# Patient Record
Sex: FEMALE | Race: WHITE | NOT HISPANIC OR LATINO | Employment: FULL TIME | URBAN - METROPOLITAN AREA
[De-identification: names, ages, dates, MRNs, and addresses within clinical notes are randomized per-mention and may not be internally consistent; named-entity substitution may affect disease eponyms.]

---

## 2018-12-18 PROBLEM — O24.419 GESTATIONAL DIABETES MELLITUS (GDM) IN THIRD TRIMESTER: Status: ACTIVE | Noted: 2018-12-18

## 2018-12-18 PROBLEM — Z34.90 ENCOUNTER FOR INDUCTION OF LABOR: Status: ACTIVE | Noted: 2018-12-18

## 2018-12-18 PROBLEM — Z34.90 TERM PREGNANCY: Status: ACTIVE | Noted: 2018-12-18

## 2018-12-26 PROBLEM — R50.9 FEVER OF UNKNOWN ORIGIN: Status: ACTIVE | Noted: 2018-12-26

## 2018-12-29 PROBLEM — R50.9 FEVER OF UNKNOWN ORIGIN: Status: RESOLVED | Noted: 2018-12-26 | Resolved: 2018-12-29

## 2019-10-03 LAB — PAP SMEAR: NORMAL

## 2020-10-23 ENCOUNTER — LAB VISIT (OUTPATIENT)
Dept: LAB | Facility: HOSPITAL | Age: 25
End: 2020-10-23
Attending: FAMILY MEDICINE
Payer: COMMERCIAL

## 2020-10-23 ENCOUNTER — OFFICE VISIT (OUTPATIENT)
Dept: FAMILY MEDICINE | Facility: CLINIC | Age: 25
End: 2020-10-23
Payer: COMMERCIAL

## 2020-10-23 VITALS
TEMPERATURE: 98 F | SYSTOLIC BLOOD PRESSURE: 122 MMHG | OXYGEN SATURATION: 97 % | BODY MASS INDEX: 30.36 KG/M2 | HEIGHT: 62 IN | DIASTOLIC BLOOD PRESSURE: 86 MMHG | WEIGHT: 165 LBS | HEART RATE: 91 BPM

## 2020-10-23 DIAGNOSIS — R07.9 EXERTIONAL CHEST PAIN: ICD-10-CM

## 2020-10-23 DIAGNOSIS — Z00.00 GENERAL MEDICAL EXAM: ICD-10-CM

## 2020-10-23 DIAGNOSIS — Z00.00 GENERAL MEDICAL EXAM: Primary | ICD-10-CM

## 2020-10-23 DIAGNOSIS — Z12.4 CERVICAL CANCER SCREENING: ICD-10-CM

## 2020-10-23 DIAGNOSIS — Z83.49 FAMILY HISTORY OF THYROIDITIS: ICD-10-CM

## 2020-10-23 DIAGNOSIS — Z82.69 FAMILY HISTORY OF RHEUMATISM: ICD-10-CM

## 2020-10-23 DIAGNOSIS — Z23 IMMUNIZATION DUE: ICD-10-CM

## 2020-10-23 LAB
ALBUMIN SERPL BCP-MCNC: 4.3 G/DL (ref 3.5–5.2)
ALP SERPL-CCNC: 68 U/L (ref 55–135)
ALT SERPL W/O P-5'-P-CCNC: 17 U/L (ref 10–44)
ANION GAP SERPL CALC-SCNC: 11 MMOL/L (ref 8–16)
AST SERPL-CCNC: 23 U/L (ref 10–40)
BASOPHILS # BLD AUTO: 0.09 K/UL (ref 0–0.2)
BASOPHILS NFR BLD: 1.2 % (ref 0–1.9)
BILIRUB SERPL-MCNC: 1.6 MG/DL (ref 0.1–1)
BUN SERPL-MCNC: 12 MG/DL (ref 6–20)
CALCIUM SERPL-MCNC: 9.2 MG/DL (ref 8.7–10.5)
CHLORIDE SERPL-SCNC: 106 MMOL/L (ref 95–110)
CHOLEST SERPL-MCNC: 160 MG/DL (ref 120–199)
CHOLEST/HDLC SERPL: 4 {RATIO} (ref 2–5)
CO2 SERPL-SCNC: 24 MMOL/L (ref 23–29)
CREAT SERPL-MCNC: 0.7 MG/DL (ref 0.5–1.4)
CRP SERPL-MCNC: 1.1 MG/L (ref 0–8.2)
DIFFERENTIAL METHOD: NORMAL
EOSINOPHIL # BLD AUTO: 0.2 K/UL (ref 0–0.5)
EOSINOPHIL NFR BLD: 3.1 % (ref 0–8)
ERYTHROCYTE [DISTWIDTH] IN BLOOD BY AUTOMATED COUNT: 12.3 % (ref 11.5–14.5)
ERYTHROCYTE [SEDIMENTATION RATE] IN BLOOD BY WESTERGREN METHOD: <2 MM/HR (ref 0–36)
EST. GFR  (AFRICAN AMERICAN): >60 ML/MIN/1.73 M^2
EST. GFR  (NON AFRICAN AMERICAN): >60 ML/MIN/1.73 M^2
GLUCOSE SERPL-MCNC: 87 MG/DL (ref 70–110)
HCT VFR BLD AUTO: 45.2 % (ref 37–48.5)
HDLC SERPL-MCNC: 40 MG/DL (ref 40–75)
HDLC SERPL: 25 % (ref 20–50)
HGB BLD-MCNC: 15.2 G/DL (ref 12–16)
IMM GRANULOCYTES # BLD AUTO: 0.02 K/UL (ref 0–0.04)
IMM GRANULOCYTES NFR BLD AUTO: 0.3 % (ref 0–0.5)
LDLC SERPL CALC-MCNC: 101.8 MG/DL (ref 63–159)
LYMPHOCYTES # BLD AUTO: 2.2 K/UL (ref 1–4.8)
LYMPHOCYTES NFR BLD: 29.2 % (ref 18–48)
MCH RBC QN AUTO: 29.8 PG (ref 27–31)
MCHC RBC AUTO-ENTMCNC: 33.6 G/DL (ref 32–36)
MCV RBC AUTO: 89 FL (ref 82–98)
MONOCYTES # BLD AUTO: 0.6 K/UL (ref 0.3–1)
MONOCYTES NFR BLD: 7.3 % (ref 4–15)
NEUTROPHILS # BLD AUTO: 4.5 K/UL (ref 1.8–7.7)
NEUTROPHILS NFR BLD: 58.9 % (ref 38–73)
NONHDLC SERPL-MCNC: 120 MG/DL
NRBC BLD-RTO: 0 /100 WBC
PLATELET # BLD AUTO: 246 K/UL (ref 150–350)
PMV BLD AUTO: 10.6 FL (ref 9.2–12.9)
POTASSIUM SERPL-SCNC: 3.9 MMOL/L (ref 3.5–5.1)
PROT SERPL-MCNC: 7 G/DL (ref 6–8.4)
RBC # BLD AUTO: 5.1 M/UL (ref 4–5.4)
RHEUMATOID FACT SERPL-ACNC: <10 IU/ML (ref 0–15)
SODIUM SERPL-SCNC: 141 MMOL/L (ref 136–145)
T3FREE SERPL-MCNC: 3.3 PG/ML (ref 2.3–4.2)
T4 FREE SERPL-MCNC: 1.09 NG/DL (ref 0.71–1.51)
TRIGL SERPL-MCNC: 91 MG/DL (ref 30–150)
TSH SERPL DL<=0.005 MIU/L-ACNC: 1.64 UIU/ML (ref 0.4–4)
WBC # BLD AUTO: 7.64 K/UL (ref 3.9–12.7)

## 2020-10-23 PROCEDURE — 99999 PR PBB SHADOW E&M-NEW PATIENT-LVL IV: ICD-10-PCS | Mod: PBBFAC,,, | Performed by: FAMILY MEDICINE

## 2020-10-23 PROCEDURE — 93005 EKG 12-LEAD: ICD-10-PCS | Mod: S$GLB,,, | Performed by: FAMILY MEDICINE

## 2020-10-23 PROCEDURE — 85025 COMPLETE CBC W/AUTO DIFF WBC: CPT

## 2020-10-23 PROCEDURE — 84481 FREE ASSAY (FT-3): CPT

## 2020-10-23 PROCEDURE — 80061 LIPID PANEL: CPT

## 2020-10-23 PROCEDURE — 86803 HEPATITIS C AB TEST: CPT

## 2020-10-23 PROCEDURE — 85652 RBC SED RATE AUTOMATED: CPT

## 2020-10-23 PROCEDURE — 90651 HPV VACCINE 9-VALENT 3 DOSE IM: ICD-10-PCS | Mod: S$GLB,,, | Performed by: FAMILY MEDICINE

## 2020-10-23 PROCEDURE — 99385 PREV VISIT NEW AGE 18-39: CPT | Mod: 25,S$GLB,, | Performed by: FAMILY MEDICINE

## 2020-10-23 PROCEDURE — 86140 C-REACTIVE PROTEIN: CPT

## 2020-10-23 PROCEDURE — 99999 PR PBB SHADOW E&M-NEW PATIENT-LVL IV: CPT | Mod: PBBFAC,,, | Performed by: FAMILY MEDICINE

## 2020-10-23 PROCEDURE — 84443 ASSAY THYROID STIM HORMONE: CPT

## 2020-10-23 PROCEDURE — 90651 9VHPV VACCINE 2/3 DOSE IM: CPT | Mod: S$GLB,,, | Performed by: FAMILY MEDICINE

## 2020-10-23 PROCEDURE — 90471 HPV VACCINE 9-VALENT 3 DOSE IM: ICD-10-PCS | Mod: S$GLB,,, | Performed by: FAMILY MEDICINE

## 2020-10-23 PROCEDURE — 93005 ELECTROCARDIOGRAM TRACING: CPT | Mod: S$GLB,,, | Performed by: FAMILY MEDICINE

## 2020-10-23 PROCEDURE — 80053 COMPREHEN METABOLIC PANEL: CPT

## 2020-10-23 PROCEDURE — 36415 COLL VENOUS BLD VENIPUNCTURE: CPT | Mod: PN

## 2020-10-23 PROCEDURE — 84439 ASSAY OF FREE THYROXINE: CPT

## 2020-10-23 PROCEDURE — 90471 IMMUNIZATION ADMIN: CPT | Mod: S$GLB,,, | Performed by: FAMILY MEDICINE

## 2020-10-23 PROCEDURE — 99385 PR PREVENTIVE VISIT,NEW,18-39: ICD-10-PCS | Mod: 25,S$GLB,, | Performed by: FAMILY MEDICINE

## 2020-10-23 PROCEDURE — 86431 RHEUMATOID FACTOR QUANT: CPT

## 2020-10-23 PROCEDURE — 93010 ELECTROCARDIOGRAM REPORT: CPT | Mod: S$GLB,,, | Performed by: INTERNAL MEDICINE

## 2020-10-23 PROCEDURE — 83036 HEMOGLOBIN GLYCOSYLATED A1C: CPT

## 2020-10-23 PROCEDURE — 86038 ANTINUCLEAR ANTIBODIES: CPT

## 2020-10-23 PROCEDURE — 93010 EKG 12-LEAD: ICD-10-PCS | Mod: S$GLB,,, | Performed by: INTERNAL MEDICINE

## 2020-10-23 RX ORDER — CETIRIZINE HYDROCHLORIDE 5 MG/1
5 TABLET ORAL DAILY
COMMUNITY

## 2020-10-23 NOTE — PROGRESS NOTES
THIS DOCUMENT WAS MADE IN PART WITH VOICE RECOGNITION SOFTWARE.  OCCASIONALLY THIS SOFTWARE WILL MISINTERPRET WORDS OR PHRASES.    Assessment and Plan:    1. General medical exam  - Comprehensive Metabolic Panel; Future  - Lipid Panel; Future  - Hepatitis C Antibody; Future  - Hemoglobin A1C; Future  - CBC auto differential; Future    2. Immunization due  - (In Office Administered) HPV Vaccine (9-Valent) (3 Dose) (IM)    3. Cervical cancer screening  Get records    4. Family history of thyroiditis  - TSH; Future  - T4, Free; Future  - T3, Free; Future    5. Family history of rheumatism  - Rheumatoid Factor; Future  - NICHOLAS Screen w/Reflex; Future  - C-Reactive Protein; Future  - Sedimentation rate; Future    6. Exertional chest pain  Low suspicion for any cardiovascular disease, does have concerning family history with early onset and grandmother and grandfather  - EKG 12-lead  - Stress Echo Which stress agent will be used? Treadmill Exercise; Color Flow Doppler? No; Future        ______________________________________________________________________  Subjective:    Chief Complaint:  Chief Complaint   Patient presents with    Establish Care    Annual Exam        HPI:  Renea is a 24 y.o. year old     Establish care    Concerns :   Family history of Hashimotos :   Pt never tested, would like to be checked     Family history of heart disease / tachycardia   Reports palps / chest pain + SOB  : during exercise/ strenuous work   Pain is substernal, non radiating, lasting for a few minutes before resolving   Happens every time  Grandfather had some type of heart disease in his 30's   Grandmother  in 50's of an MI     Family h/o RA   She complains of left shoulder pain and right shoulder pain  No other joint pains     H/o Pancreatitis   Gallstone pancreatitis in early s  S/P larisa malin           Past Medical History:  Past Medical History:   Diagnosis Date    Pancreatitis        Past Surgical History:  Past  Surgical History:   Procedure Laterality Date    ADENOIDECTOMY      CHOLECYSTECTOMY      SKIN GRAFT      TONSILLECTOMY      WISDOM TOOTH EXTRACTION         Family History:  Family History   Problem Relation Age of Onset    Diabetes Maternal Grandmother     Heart disease Maternal Grandmother     Heart disease Maternal Grandfather        Social History:  Social History     Socioeconomic History    Marital status: Single     Spouse name: Not on file    Number of children: Not on file    Years of education: Not on file    Highest education level: Not on file   Occupational History    Not on file   Social Needs    Financial resource strain: Not on file    Food insecurity     Worry: Not on file     Inability: Not on file    Transportation needs     Medical: Not on file     Non-medical: Not on file   Tobacco Use    Smoking status: Never Smoker    Smokeless tobacco: Never Used   Substance and Sexual Activity    Alcohol use: No    Drug use: No    Sexual activity: Yes     Partners: Male   Lifestyle    Physical activity     Days per week: Not on file     Minutes per session: Not on file    Stress: Not on file   Relationships    Social connections     Talks on phone: Not on file     Gets together: Not on file     Attends Religion service: Not on file     Active member of club or organization: Not on file     Attends meetings of clubs or organizations: Not on file     Relationship status: Not on file   Other Topics Concern    Not on file   Social History Narrative    Not on file       Medications:  Current Outpatient Medications on File Prior to Visit   Medication Sig Dispense Refill    cetirizine (ZYRTEC) 5 MG tablet Take 5 mg by mouth once daily.      [DISCONTINUED] ibuprofen (ADVIL,MOTRIN) 600 MG tablet Take 1 tablet (600 mg total) by mouth every 6 (six) hours. 40 tablet 2     No current facility-administered medications on file prior to visit.   "      Allergies:  Penicillins    Immunizations:  Immunization History   Administered Date(s) Administered    Influenza - Quadrivalent - PF *Preferred* (6 months and older) 12/20/2018    Tdap 12/20/2018       Review of Systems:  Review of Systems   All other systems reviewed and are negative.      Objective:    Vitals:  Vitals:    10/23/20 0841   BP: 122/86   Pulse: 91   Temp: 98.4 °F (36.9 °C)   TempSrc: Temporal   SpO2: 97%   Weight: 74.9 kg (165 lb 0.2 oz)   Height: 5' 2" (1.575 m)   PainSc: 0-No pain       Physical Exam  Vitals signs reviewed.   Constitutional:       General: She is not in acute distress.  HENT:      Head: Normocephalic and atraumatic.   Eyes:      Pupils: Pupils are equal, round, and reactive to light.   Neck:      Musculoskeletal: Neck supple.   Cardiovascular:      Rate and Rhythm: Normal rate and regular rhythm.      Heart sounds: No murmur. No friction rub.   Pulmonary:      Effort: Pulmonary effort is normal.      Breath sounds: Normal breath sounds.   Abdominal:      General: Bowel sounds are normal. There is no distension.      Palpations: Abdomen is soft.      Tenderness: There is no abdominal tenderness.   Skin:     General: Skin is warm and dry.      Findings: No rash.   Psychiatric:         Behavior: Behavior normal.         Data:  No previous labs, imaging, or notes available.        Jeffrey Biggs MD  Family Medicine      "

## 2020-10-24 LAB
ESTIMATED AVG GLUCOSE: 85 MG/DL (ref 68–131)
HBA1C MFR BLD HPLC: 4.6 % (ref 4–5.6)

## 2020-10-26 LAB
ANA SER QL IF: NORMAL
HCV AB SERPL QL IA: NEGATIVE

## 2020-10-27 ENCOUNTER — PATIENT MESSAGE (OUTPATIENT)
Dept: FAMILY MEDICINE | Facility: CLINIC | Age: 25
End: 2020-10-27

## 2020-10-27 DIAGNOSIS — D64.9 NORMOCYTIC ANEMIA: Primary | ICD-10-CM

## 2020-10-27 NOTE — TELEPHONE ENCOUNTER
Please call patient, let her know lab work looks good except her blood count which is low.  I would like her to do some follow-up labs so we can investigate the cause.

## 2020-10-30 ENCOUNTER — PATIENT MESSAGE (OUTPATIENT)
Dept: LAB | Facility: HOSPITAL | Age: 25
End: 2020-10-30

## 2020-11-02 ENCOUNTER — LAB VISIT (OUTPATIENT)
Dept: LAB | Facility: HOSPITAL | Age: 25
End: 2020-11-02
Attending: FAMILY MEDICINE
Payer: COMMERCIAL

## 2020-11-02 DIAGNOSIS — D64.9 NORMOCYTIC ANEMIA: ICD-10-CM

## 2020-11-02 LAB
FERRITIN SERPL-MCNC: 59 NG/ML (ref 20–300)
FOLATE SERPL-MCNC: 6.9 NG/ML (ref 4–24)
IRON SERPL-MCNC: 76 UG/DL (ref 30–160)
PATH REV BLD -IMP: NORMAL
SATURATED IRON: 20 % (ref 20–50)
TOTAL IRON BINDING CAPACITY: 380 UG/DL (ref 250–450)
TRANSFERRIN SERPL-MCNC: 257 MG/DL (ref 200–375)
VIT B12 SERPL-MCNC: 271 PG/ML (ref 210–950)

## 2020-11-02 PROCEDURE — 82728 ASSAY OF FERRITIN: CPT

## 2020-11-02 PROCEDURE — 85060 PATHOLOGIST REVIEW: ICD-10-PCS | Mod: ,,, | Performed by: PATHOLOGY

## 2020-11-02 PROCEDURE — 85025 COMPLETE CBC W/AUTO DIFF WBC: CPT

## 2020-11-02 PROCEDURE — 82607 VITAMIN B-12: CPT

## 2020-11-02 PROCEDURE — 83020 HEMOGLOBIN ELECTROPHORESIS: CPT

## 2020-11-02 PROCEDURE — 36415 COLL VENOUS BLD VENIPUNCTURE: CPT | Mod: PN

## 2020-11-02 PROCEDURE — 82746 ASSAY OF FOLIC ACID SERUM: CPT

## 2020-11-02 PROCEDURE — 85060 BLOOD SMEAR INTERPRETATION: CPT | Mod: ,,, | Performed by: PATHOLOGY

## 2020-11-02 PROCEDURE — 83540 ASSAY OF IRON: CPT

## 2020-11-03 LAB
BASOPHILS # BLD AUTO: 0.1 K/UL (ref 0–0.2)
BASOPHILS NFR BLD: 1.3 % (ref 0–1.9)
DIFFERENTIAL METHOD: ABNORMAL
EOSINOPHIL # BLD AUTO: 0.3 K/UL (ref 0–0.5)
EOSINOPHIL NFR BLD: 3.2 % (ref 0–8)
ERYTHROCYTE [DISTWIDTH] IN BLOOD BY AUTOMATED COUNT: 12.7 % (ref 11.5–14.5)
HCT VFR BLD AUTO: 45.2 % (ref 37–48.5)
HGB BLD-MCNC: 15.4 G/DL (ref 12–16)
IMM GRANULOCYTES # BLD AUTO: 0.02 K/UL (ref 0–0.04)
IMM GRANULOCYTES NFR BLD AUTO: 0.3 % (ref 0–0.5)
LYMPHOCYTES # BLD AUTO: 2.2 K/UL (ref 1–4.8)
LYMPHOCYTES NFR BLD: 28.8 % (ref 18–48)
MCH RBC QN AUTO: 31.2 PG (ref 27–31)
MCHC RBC AUTO-ENTMCNC: 34.1 G/DL (ref 32–36)
MCV RBC AUTO: 92 FL (ref 82–98)
MONOCYTES # BLD AUTO: 0.6 K/UL (ref 0.3–1)
MONOCYTES NFR BLD: 7.9 % (ref 4–15)
NEUTROPHILS # BLD AUTO: 4.5 K/UL (ref 1.8–7.7)
NEUTROPHILS NFR BLD: 58.5 % (ref 38–73)
NRBC BLD-RTO: 0 /100 WBC
PLATELET # BLD AUTO: 328 K/UL (ref 150–350)
PMV BLD AUTO: 11 FL (ref 9.2–12.9)
RBC # BLD AUTO: 4.94 M/UL (ref 4–5.4)
WBC # BLD AUTO: 7.75 K/UL (ref 3.9–12.7)

## 2020-11-04 ENCOUNTER — TELEPHONE (OUTPATIENT)
Dept: FAMILY MEDICINE | Facility: CLINIC | Age: 25
End: 2020-11-04

## 2020-11-04 DIAGNOSIS — R07.9 EXERTIONAL CHEST PAIN: Primary | ICD-10-CM

## 2020-11-04 LAB
HGB A MFR BLD ELPH: 97.6 % (ref 95.8–98)
HGB A2 MFR BLD: 2.4 % (ref 2–3.3)
HGB F MFR BLD: 0 % (ref 0–0.9)
HGB FRACT BLD ELPH-IMP: NORMAL
HGB OTHER MFR BLD ELPH: 0 %
PATH REV BLD -IMP: NORMAL
THEVP VARIANT 2: NORMAL
THEVP VARIANT 3: NORMAL

## 2020-11-04 NOTE — TELEPHONE ENCOUNTER
[facility] has denied stress echo as it did not meet their criteria.  Please call [phone] option 3 with case # [id number] to do an appeal.  Thanks Clover

## 2020-11-04 NOTE — TELEPHONE ENCOUNTER
They will not allow me to do an echocardiogram stress test but they will allow an exercise stress test and a separate echocardiogram.  Please help schedule both.  Orders placed

## 2020-11-05 ENCOUNTER — CLINICAL SUPPORT (OUTPATIENT)
Dept: CARDIOLOGY | Facility: CLINIC | Age: 25
End: 2020-11-05
Attending: FAMILY MEDICINE
Payer: COMMERCIAL

## 2020-11-05 VITALS
WEIGHT: 165 LBS | HEIGHT: 62 IN | BODY MASS INDEX: 30.36 KG/M2 | HEIGHT: 62 IN | WEIGHT: 165 LBS | BODY MASS INDEX: 30.36 KG/M2

## 2020-11-05 DIAGNOSIS — R07.9 EXERTIONAL CHEST PAIN: ICD-10-CM

## 2020-11-05 LAB
AV INDEX (PROSTH): 0.94
AV MEAN GRADIENT: 3 MMHG
AV PEAK GRADIENT: 5 MMHG
AV VALVE AREA: 3.21 CM2
AV VELOCITY RATIO: 0.8
BSA FOR ECHO PROCEDURE: 1.81 M2
CV ECHO LV RWT: 0.3 CM
DOP CALC AO PEAK VEL: 1.14 M/S
DOP CALC AO VTI: 18.89 CM
DOP CALC LVOT AREA: 3.4 CM2
DOP CALC LVOT DIAMETER: 2.09 CM
DOP CALC LVOT PEAK VEL: 0.91 M/S
DOP CALC LVOT STROKE VOLUME: 60.62 CM3
DOP CALCLVOT PEAK VEL VTI: 17.68 CM
E WAVE DECELERATION TIME: 126.13 MSEC
E/A RATIO: 1.29
E/E' RATIO: 6 M/S
ECHO LV POSTERIOR WALL: 0.72 CM (ref 0.6–1.1)
FRACTIONAL SHORTENING: 33 % (ref 28–44)
INTERVENTRICULAR SEPTUM: 0.88 CM (ref 0.6–1.1)
LA MAJOR: 3.54 CM
LA MINOR: 3.54 CM
LA WIDTH: 3.57 CM
LEFT ATRIUM SIZE: 2.52 CM
LEFT ATRIUM VOLUME INDEX: 15.4 ML/M2
LEFT ATRIUM VOLUME: 27.07 CM3
LEFT INTERNAL DIMENSION IN SYSTOLE: 3.19 CM (ref 2.1–4)
LEFT VENTRICLE DIASTOLIC VOLUME INDEX: 59.13 ML/M2
LEFT VENTRICLE DIASTOLIC VOLUME: 104.15 ML
LEFT VENTRICLE MASS INDEX: 70 G/M2
LEFT VENTRICLE SYSTOLIC VOLUME INDEX: 23.1 ML/M2
LEFT VENTRICLE SYSTOLIC VOLUME: 40.64 ML
LEFT VENTRICULAR INTERNAL DIMENSION IN DIASTOLE: 4.73 CM (ref 3.5–6)
LEFT VENTRICULAR MASS: 123.58 G
LV LATERAL E/E' RATIO: 5 M/S
LV SEPTAL E/E' RATIO: 7.5 M/S
MV PEAK A VEL: 0.58 M/S
MV PEAK E VEL: 0.75 M/S
MV STENOSIS PRESSURE HALF TIME: 36.58 MS
MV VALVE AREA P 1/2 METHOD: 6.01 CM2
PULM VEIN S/D RATIO: 0.76
PV PEAK D VEL: 0.55 M/S
PV PEAK S VEL: 0.42 M/S
RA MAJOR: 3.52 CM
RA PRESSURE: 3 MMHG
RA WIDTH: 2.54 CM
RIGHT VENTRICULAR END-DIASTOLIC DIMENSION: 2.59 CM
TDI LATERAL: 0.15 M/S
TDI SEPTAL: 0.1 M/S
TDI: 0.13 M/S
TRICUSPID ANNULAR PLANE SYSTOLIC EXCURSION: 1.84 CM

## 2020-11-05 PROCEDURE — 93015 CV STRESS TEST SUPVJ I&R: CPT | Mod: S$GLB,,, | Performed by: INTERNAL MEDICINE

## 2020-11-05 PROCEDURE — 93306 TTE W/DOPPLER COMPLETE: CPT | Mod: S$GLB,,, | Performed by: INTERNAL MEDICINE

## 2020-11-05 PROCEDURE — 93015 EXERCISE STRESS - EKG (CUPID ONLY): ICD-10-PCS | Mod: S$GLB,,, | Performed by: INTERNAL MEDICINE

## 2020-11-05 PROCEDURE — 99999 PR PBB SHADOW E&M-EST. PATIENT-LVL I: ICD-10-PCS | Mod: PBBFAC,,,

## 2020-11-05 PROCEDURE — 93306 ECHO (CUPID ONLY): ICD-10-PCS | Mod: S$GLB,,, | Performed by: INTERNAL MEDICINE

## 2020-11-05 PROCEDURE — 99999 PR PBB SHADOW E&M-EST. PATIENT-LVL I: CPT | Mod: PBBFAC,,,

## 2020-11-06 LAB
CV STRESS BASE HR: 125 BPM
DIASTOLIC BLOOD PRESSURE: 89 MMHG
OHS CV CPX 1 MINUTE RECOVERY HEART RATE: 117 BPM
OHS CV CPX 85 PERCENT MAX PREDICTED HEART RATE MALE: 156
OHS CV CPX ESTIMATED METS: 9
OHS CV CPX MAX PREDICTED HEART RATE: 183
OHS CV CPX PATIENT IS FEMALE: 1
OHS CV CPX PATIENT IS MALE: 0
OHS CV CPX PEAK DIASTOLIC BLOOD PRESSURE: 60 MMHG
OHS CV CPX PEAK HEAR RATE: 160 BPM
OHS CV CPX PEAK RATE PRESSURE PRODUCT: NORMAL
OHS CV CPX PEAK SYSTOLIC BLOOD PRESSURE: 148 MMHG
OHS CV CPX PERCENT MAX PREDICTED HEART RATE ACHIEVED: 87
OHS CV CPX RATE PRESSURE PRODUCT PRESENTING: NORMAL
STRESS ECHO POST EXERCISE DUR MIN: 5 MINUTES
STRESS ECHO POST EXERCISE DUR SEC: 19 SECONDS
SYSTOLIC BLOOD PRESSURE: 118 MMHG

## 2020-11-24 ENCOUNTER — PATIENT OUTREACH (OUTPATIENT)
Dept: ADMINISTRATIVE | Facility: HOSPITAL | Age: 25
End: 2020-11-24

## 2020-12-17 ENCOUNTER — PATIENT MESSAGE (OUTPATIENT)
Dept: FAMILY MEDICINE | Facility: CLINIC | Age: 25
End: 2020-12-17

## 2020-12-28 ENCOUNTER — CLINICAL SUPPORT (OUTPATIENT)
Dept: FAMILY MEDICINE | Facility: CLINIC | Age: 25
End: 2020-12-28
Payer: COMMERCIAL

## 2020-12-28 DIAGNOSIS — Z23 ENCOUNTER FOR ADMINISTRATION OF VACCINE: Primary | ICD-10-CM

## 2020-12-28 PROCEDURE — 90651 9VHPV VACCINE 2/3 DOSE IM: CPT | Mod: S$GLB,,, | Performed by: FAMILY MEDICINE

## 2020-12-28 PROCEDURE — 90471 IMMUNIZATION ADMIN: CPT | Mod: S$GLB,,, | Performed by: FAMILY MEDICINE

## 2020-12-28 PROCEDURE — 90471 HPV VACCINE 9-VALENT 3 DOSE IM: ICD-10-PCS | Mod: S$GLB,,, | Performed by: FAMILY MEDICINE

## 2020-12-28 PROCEDURE — 90651 HPV VACCINE 9-VALENT 3 DOSE IM: ICD-10-PCS | Mod: S$GLB,,, | Performed by: FAMILY MEDICINE

## 2020-12-28 NOTE — PROGRESS NOTES
Confirmed pt name and . Verified medication with Em MCCLELLAN LPN. Administered Gardasil IM to Left deltoid per orders. Dose #2. Pt tolerated well. Scheduled pt for HPV dose #3 on 20.

## 2021-04-22 ENCOUNTER — PATIENT MESSAGE (OUTPATIENT)
Dept: FAMILY MEDICINE | Facility: CLINIC | Age: 26
End: 2021-04-22

## 2021-04-30 ENCOUNTER — CLINICAL SUPPORT (OUTPATIENT)
Dept: FAMILY MEDICINE | Facility: CLINIC | Age: 26
End: 2021-04-30
Payer: COMMERCIAL

## 2021-04-30 DIAGNOSIS — Z23 ENCOUNTER FOR ADMINISTRATION OF VACCINE: Primary | ICD-10-CM

## 2021-04-30 PROCEDURE — 90471 IMMUNIZATION ADMIN: CPT | Mod: S$GLB,,, | Performed by: FAMILY MEDICINE

## 2021-04-30 PROCEDURE — 90471 HPV VACCINE 9-VALENT 3 DOSE IM: ICD-10-PCS | Mod: S$GLB,,, | Performed by: FAMILY MEDICINE

## 2021-04-30 PROCEDURE — 90651 9VHPV VACCINE 2/3 DOSE IM: CPT | Mod: S$GLB,,, | Performed by: FAMILY MEDICINE

## 2021-04-30 PROCEDURE — 90651 HPV VACCINE 9-VALENT 3 DOSE IM: ICD-10-PCS | Mod: S$GLB,,, | Performed by: FAMILY MEDICINE

## 2022-02-28 ENCOUNTER — PATIENT MESSAGE (OUTPATIENT)
Dept: ADMINISTRATIVE | Facility: HOSPITAL | Age: 27
End: 2022-02-28
Payer: COMMERCIAL

## 2022-05-31 ENCOUNTER — PATIENT MESSAGE (OUTPATIENT)
Dept: ADMINISTRATIVE | Facility: HOSPITAL | Age: 27
End: 2022-05-31
Payer: COMMERCIAL

## 2023-03-22 ENCOUNTER — PATIENT OUTREACH (OUTPATIENT)
Dept: ADMINISTRATIVE | Facility: HOSPITAL | Age: 28
End: 2023-03-22
Payer: COMMERCIAL

## 2023-03-22 NOTE — PROGRESS NOTES
Contacted pt about scheduling f/u appt w/ Dr. Biggs's physician assistant Cece Houser, pt agreed. Scheduled for 3/23.

## 2023-03-23 ENCOUNTER — OFFICE VISIT (OUTPATIENT)
Dept: FAMILY MEDICINE | Facility: CLINIC | Age: 28
End: 2023-03-23
Payer: COMMERCIAL

## 2023-03-23 VITALS
BODY MASS INDEX: 28.66 KG/M2 | HEIGHT: 62 IN | HEART RATE: 84 BPM | WEIGHT: 155.75 LBS | SYSTOLIC BLOOD PRESSURE: 142 MMHG | OXYGEN SATURATION: 98 % | RESPIRATION RATE: 16 BRPM | DIASTOLIC BLOOD PRESSURE: 90 MMHG

## 2023-03-23 DIAGNOSIS — Z00.00 GENERAL MEDICAL EXAM: Primary | ICD-10-CM

## 2023-03-23 DIAGNOSIS — R03.0 ELEVATED BP WITHOUT DIAGNOSIS OF HYPERTENSION: ICD-10-CM

## 2023-03-23 DIAGNOSIS — R20.2 PARESTHESIAS: ICD-10-CM

## 2023-03-23 DIAGNOSIS — S16.1XXA STRAIN OF NECK MUSCLE, INITIAL ENCOUNTER: ICD-10-CM

## 2023-03-23 PROCEDURE — 3077F PR MOST RECENT SYSTOLIC BLOOD PRESSURE >= 140 MM HG: ICD-10-PCS | Mod: CPTII,S$GLB,,

## 2023-03-23 PROCEDURE — 3080F DIAST BP >= 90 MM HG: CPT | Mod: CPTII,S$GLB,,

## 2023-03-23 PROCEDURE — 3077F SYST BP >= 140 MM HG: CPT | Mod: CPTII,S$GLB,,

## 2023-03-23 PROCEDURE — 99213 OFFICE O/P EST LOW 20 MIN: CPT | Mod: S$GLB,,,

## 2023-03-23 PROCEDURE — 3008F BODY MASS INDEX DOCD: CPT | Mod: CPTII,S$GLB,,

## 2023-03-23 PROCEDURE — 1159F MED LIST DOCD IN RCRD: CPT | Mod: CPTII,S$GLB,,

## 2023-03-23 PROCEDURE — 3080F PR MOST RECENT DIASTOLIC BLOOD PRESSURE >= 90 MM HG: ICD-10-PCS | Mod: CPTII,S$GLB,,

## 2023-03-23 PROCEDURE — 99213 PR OFFICE/OUTPT VISIT, EST, LEVL III, 20-29 MIN: ICD-10-PCS | Mod: S$GLB,,,

## 2023-03-23 PROCEDURE — 99999 PR PBB SHADOW E&M-EST. PATIENT-LVL III: CPT | Mod: PBBFAC,,,

## 2023-03-23 PROCEDURE — 99999 PR PBB SHADOW E&M-EST. PATIENT-LVL III: ICD-10-PCS | Mod: PBBFAC,,,

## 2023-03-23 PROCEDURE — 3008F PR BODY MASS INDEX (BMI) DOCUMENTED: ICD-10-PCS | Mod: CPTII,S$GLB,,

## 2023-03-23 PROCEDURE — 1159F PR MEDICATION LIST DOCUMENTED IN MEDICAL RECORD: ICD-10-PCS | Mod: CPTII,S$GLB,,

## 2023-03-23 RX ORDER — TIZANIDINE 4 MG/1
4 TABLET ORAL EVERY 8 HOURS
Qty: 15 TABLET | Refills: 0 | Status: SHIPPED | OUTPATIENT
Start: 2023-03-23 | End: 2023-03-28

## 2023-03-23 NOTE — PROGRESS NOTES
"[facility]  [address]  [place] [address]    Subjective    Chief Complaint:   Chief Complaint   Patient presents with    Follow-up    Shoulder Pain     Moving an hurt Roger antunezlers        History of Present Illness:     Renea Cox is a(n) 27 y.o. female with unremarkable past medical history as noted below who presents to the clinic today for follow up and for upper back pain.     She has not seen PCP in about 2.5 years. No diagnosis of hypertension, labs unremarkable at last visit.     She is c/o upper back pain with intermittent paresthesias of bilateral upper extremities. This started about 2 weeks ago after moving. Tingling is episodic and unilateral during an episode, but occurs on both sides. Pain associated with numbness and tingling into bilateral hands. No shoulder or arm weakness but "feels like arms are asleep" when episodes occur. No fevers, midline back pain, or prior issues of this nature. No gait disturbance, speech difficulty, facial asymmetry or other neurologic deficits when episodes occur. +muscle soreness after moving     No prior diagnosis of hypertension. /90 upon my recheck today. No chest pain or shortness of breath.       Problem List:   Patient Active Problem List   Diagnosis    Encounter for induction of labor    Term pregnancy    Gestational diabetes mellitus (GDM) in third trimester       Current Outpatient Medications:   Current Outpatient Medications   Medication Instructions    cetirizine (ZYRTEC) 5 mg, Oral, Daily    tiZANidine (ZANAFLEX) 4 mg, Oral, Every 8 hours       Surgical History:   Past Surgical History:   Procedure Laterality Date    ADENOIDECTOMY      ADENOIDECTOMY  2000    CHOLECYSTECTOMY      SKIN GRAFT      TONSILLECTOMY      WISDOM TOOTH EXTRACTION         Family History:   Family History   Problem Relation Age of Onset    Diabetes Maternal Grandmother         Type 2    Heart disease Maternal " "Grandmother     Miscarriages / Stillbirths Maternal Grandmother     Heart disease Maternal Grandfather     Arthritis Maternal Grandfather     Cancer Maternal Grandfather         Skin    Hearing loss Maternal Grandfather     Arthritis Mother     Cancer Mother         Skin    Depression Mother     Drug abuse Mother     Asthma Sister     Asthma Brother     Drug abuse Brother     Cancer Maternal Uncle         Esophagus    Depression Father        Allergies:   Review of patient's allergies indicates:   Allergen Reactions    Penicillins Hives       Tobacco Status:   Tobacco Use: Low Risk     Smoking Tobacco Use: Never    Smokeless Tobacco Use: Never    Passive Exposure: Not on file       Sexual Activity:   Social History     Substance and Sexual Activity   Sexual Activity Yes    Partners: Male    Birth control/protection: Condom, I.U.D.       Alcohol Use:   Social History     Substance and Sexual Activity   Alcohol Use No         Objective       Vitals:    03/23/23 1453   BP: (!) 142/90   BP Location: Left arm   Patient Position: Sitting   Pulse: 84   Resp: 16   SpO2: 98%   Weight: 70.7 kg (155 lb 12.1 oz)   Height: 5' 2" (1.575 m)       Review of Systems   Constitutional:  Negative for chills and fever.   Respiratory:  Negative for shortness of breath.    Cardiovascular:  Negative for chest pain.   Musculoskeletal:  Positive for back pain. Negative for falls.   Neurological:  Positive for tingling and sensory change.     Physical Exam  Constitutional:       General: She is not in acute distress.     Appearance: Normal appearance.   HENT:      Head: Normocephalic and atraumatic.      Nose: Nose normal.      Mouth/Throat:      Mouth: Mucous membranes are moist.      Pharynx: Oropharynx is clear.   Eyes:      Pupils: Pupils are equal, round, and reactive to light.   Cardiovascular:      Rate and Rhythm: Normal rate and regular rhythm.      Heart sounds: Normal heart sounds. No murmur heard.  Pulmonary:      Effort: " Pulmonary effort is normal. No respiratory distress.      Breath sounds: Normal breath sounds. No wheezing.   Abdominal:      General: Abdomen is flat. Bowel sounds are normal. There is no distension.      Palpations: Abdomen is soft.      Tenderness: There is no abdominal tenderness. There is no guarding.   Musculoskeletal:      Right shoulder: Normal. No tenderness. Normal range of motion. Normal strength.      Left shoulder: Normal. No tenderness. Normal range of motion. Normal strength.      Right upper arm: Normal.      Left upper arm: Normal.        Arms:       Cervical back: Normal range of motion.      Comments: Area of pain in diagram above. No TTP or pain in clinic today. Normal sensation and neurovascular status today   Skin:     General: Skin is warm.   Neurological:      Mental Status: She is alert and oriented to person, place, and time.      Sensory: Sensation is intact. No sensory deficit.      Motor: Motor function is intact. No weakness, atrophy or abnormal muscle tone.      Coordination: Coordination is intact.      Gait: Gait is intact.      Deep Tendon Reflexes: Reflexes are normal and symmetric.   Psychiatric:         Behavior: Behavior normal.         Assessment and Plan:    1. General medical exam  -     CBC Auto Differential; Future; Expected date: 03/23/2023  -     Comprehensive Metabolic Panel; Future; Expected date: 03/23/2023  -     Lipid Panel; Future; Expected date: 03/23/2023  -     TSH; Future; Expected date: 03/23/2023    2. Elevated BP without diagnosis of hypertension    3. Strain of neck muscle, initial encounter  -     tiZANidine (ZANAFLEX) 4 MG tablet; Take 1 tablet (4 mg total) by mouth every 8 (eight) hours. for 5 days  Dispense: 15 tablet; Refill: 0    4. Paresthesias        Visit summary:    Renea Cox presented today for cervical back pain likely muscle strain with associated upper extremity paresthesias.     Pain likely associated with strenuous activity with  moving heavy boxes/furniture. Prescribed muscle relaxer, discussed potential side effects and safety instructions. Will consider neuroimaging if symptoms persistent at 2 wk f/u     Ordered routine labs as above.    BP elevated with no diagnosis HTN. Pt to return in 2 weeks for BP recheck with me where we will initiate antihypertensive if still elevated. Instructed to monitor at home for the next 2 weeks.     Patient was instructed to report to ER if symptoms become severe.    Follow up: With me in 2 weeks to recheck BP and reassess symptoms. Follow up in about 3 months with PCP      Cece Houser PA-C

## 2023-03-31 ENCOUNTER — LAB VISIT (OUTPATIENT)
Dept: LAB | Facility: HOSPITAL | Age: 28
End: 2023-03-31
Payer: COMMERCIAL

## 2023-03-31 DIAGNOSIS — Z00.00 GENERAL MEDICAL EXAM: ICD-10-CM

## 2023-03-31 LAB
ALBUMIN SERPL BCP-MCNC: 4.2 G/DL (ref 3.5–5.2)
ALP SERPL-CCNC: 60 U/L (ref 55–135)
ALT SERPL W/O P-5'-P-CCNC: 16 U/L (ref 10–44)
ANION GAP SERPL CALC-SCNC: 11 MMOL/L (ref 8–16)
AST SERPL-CCNC: 19 U/L (ref 10–40)
BASOPHILS # BLD AUTO: 0.11 K/UL (ref 0–0.2)
BASOPHILS NFR BLD: 1.5 % (ref 0–1.9)
BILIRUB SERPL-MCNC: 1.2 MG/DL (ref 0.1–1)
BUN SERPL-MCNC: 14 MG/DL (ref 6–20)
CALCIUM SERPL-MCNC: 9.5 MG/DL (ref 8.7–10.5)
CHLORIDE SERPL-SCNC: 103 MMOL/L (ref 95–110)
CHOLEST SERPL-MCNC: 158 MG/DL (ref 120–199)
CHOLEST/HDLC SERPL: 3.7 {RATIO} (ref 2–5)
CO2 SERPL-SCNC: 24 MMOL/L (ref 23–29)
CREAT SERPL-MCNC: 0.7 MG/DL (ref 0.5–1.4)
DIFFERENTIAL METHOD: NORMAL
EOSINOPHIL # BLD AUTO: 0.3 K/UL (ref 0–0.5)
EOSINOPHIL NFR BLD: 4.3 % (ref 0–8)
ERYTHROCYTE [DISTWIDTH] IN BLOOD BY AUTOMATED COUNT: 12 % (ref 11.5–14.5)
EST. GFR  (NO RACE VARIABLE): >60 ML/MIN/1.73 M^2
GLUCOSE SERPL-MCNC: 80 MG/DL (ref 70–110)
HCT VFR BLD AUTO: 43.6 % (ref 37–48.5)
HDLC SERPL-MCNC: 43 MG/DL (ref 40–75)
HDLC SERPL: 27.2 % (ref 20–50)
HGB BLD-MCNC: 14.8 G/DL (ref 12–16)
IMM GRANULOCYTES # BLD AUTO: 0.01 K/UL (ref 0–0.04)
IMM GRANULOCYTES NFR BLD AUTO: 0.1 % (ref 0–0.5)
LDLC SERPL CALC-MCNC: 96.2 MG/DL (ref 63–159)
LYMPHOCYTES # BLD AUTO: 2.2 K/UL (ref 1–4.8)
LYMPHOCYTES NFR BLD: 29.5 % (ref 18–48)
MCH RBC QN AUTO: 30.5 PG (ref 27–31)
MCHC RBC AUTO-ENTMCNC: 33.9 G/DL (ref 32–36)
MCV RBC AUTO: 90 FL (ref 82–98)
MONOCYTES # BLD AUTO: 0.6 K/UL (ref 0.3–1)
MONOCYTES NFR BLD: 7.4 % (ref 4–15)
NEUTROPHILS # BLD AUTO: 4.3 K/UL (ref 1.8–7.7)
NEUTROPHILS NFR BLD: 57.2 % (ref 38–73)
NONHDLC SERPL-MCNC: 115 MG/DL
NRBC BLD-RTO: 0 /100 WBC
PLATELET # BLD AUTO: 256 K/UL (ref 150–450)
PMV BLD AUTO: 11 FL (ref 9.2–12.9)
POTASSIUM SERPL-SCNC: 3.9 MMOL/L (ref 3.5–5.1)
PROT SERPL-MCNC: 6.7 G/DL (ref 6–8.4)
RBC # BLD AUTO: 4.86 M/UL (ref 4–5.4)
SODIUM SERPL-SCNC: 138 MMOL/L (ref 136–145)
T4 FREE SERPL-MCNC: 0.92 NG/DL (ref 0.71–1.51)
TRIGL SERPL-MCNC: 94 MG/DL (ref 30–150)
TSH SERPL DL<=0.005 MIU/L-ACNC: 4.13 UIU/ML (ref 0.4–4)
WBC # BLD AUTO: 7.46 K/UL (ref 3.9–12.7)

## 2023-03-31 PROCEDURE — 80061 LIPID PANEL: CPT

## 2023-03-31 PROCEDURE — 36415 COLL VENOUS BLD VENIPUNCTURE: CPT | Mod: PN

## 2023-03-31 PROCEDURE — 84443 ASSAY THYROID STIM HORMONE: CPT

## 2023-03-31 PROCEDURE — 85025 COMPLETE CBC W/AUTO DIFF WBC: CPT

## 2023-03-31 PROCEDURE — 84439 ASSAY OF FREE THYROXINE: CPT

## 2023-03-31 PROCEDURE — 80053 COMPREHEN METABOLIC PANEL: CPT

## 2023-04-03 ENCOUNTER — TELEPHONE (OUTPATIENT)
Dept: FAMILY MEDICINE | Facility: CLINIC | Age: 28
End: 2023-04-03
Payer: COMMERCIAL

## 2023-04-03 NOTE — TELEPHONE ENCOUNTER
Pt informed of labs.  Verbalized understanding.  She is not having any new symptoms.  She will scheduled 6 wk labs at fu with Dr Spain

## 2023-04-03 NOTE — TELEPHONE ENCOUNTER
----- Message from Cece Houser PA-C sent at 4/3/2023 12:32 PM CDT -----  Please call patient to let her know I reviewed labs and her thyroid level is borderline low. Unless she is having any new symptoms, I will order repeat labs to be done in about 6 weeks. Please follow up on this during appt with Dr. Spain.    Cece Houser PA-C

## 2023-04-06 ENCOUNTER — OFFICE VISIT (OUTPATIENT)
Dept: FAMILY MEDICINE | Facility: CLINIC | Age: 28
End: 2023-04-06
Payer: COMMERCIAL

## 2023-04-06 ENCOUNTER — HOSPITAL ENCOUNTER (OUTPATIENT)
Dept: RADIOLOGY | Facility: HOSPITAL | Age: 28
Discharge: HOME | End: 2023-04-06
Attending: STUDENT IN AN ORGANIZED HEALTH CARE EDUCATION/TRAINING PROGRAM
Payer: COMMERCIAL

## 2023-04-06 VITALS
DIASTOLIC BLOOD PRESSURE: 80 MMHG | WEIGHT: 155.13 LBS | SYSTOLIC BLOOD PRESSURE: 132 MMHG | BODY MASS INDEX: 28.37 KG/M2

## 2023-04-06 DIAGNOSIS — R20.2 PARESTHESIA OF RIGHT UPPER EXTREMITY: ICD-10-CM

## 2023-04-06 DIAGNOSIS — M54.12 CERVICAL RADICULOPATHY: ICD-10-CM

## 2023-04-06 DIAGNOSIS — M54.12 CERVICAL RADICULOPATHY: Primary | ICD-10-CM

## 2023-04-06 DIAGNOSIS — R20.2 PARESTHESIA OF LEFT UPPER EXTREMITY: ICD-10-CM

## 2023-04-06 PROBLEM — O24.419 GESTATIONAL DIABETES MELLITUS (GDM) IN THIRD TRIMESTER: Status: RESOLVED | Noted: 2018-12-18 | Resolved: 2023-04-06

## 2023-04-06 PROBLEM — Z34.90 TERM PREGNANCY: Status: RESOLVED | Noted: 2018-12-18 | Resolved: 2023-04-06

## 2023-04-06 PROBLEM — Z34.90 ENCOUNTER FOR INDUCTION OF LABOR: Status: RESOLVED | Noted: 2018-12-18 | Resolved: 2023-04-06

## 2023-04-06 PROCEDURE — 72070 XR THORACIC SPINE AP LATERAL: ICD-10-PCS | Mod: 26,,, | Performed by: RADIOLOGY

## 2023-04-06 PROCEDURE — 99999 PR PBB SHADOW E&M-EST. PATIENT-LVL III: ICD-10-PCS | Mod: PBBFAC,,, | Performed by: STUDENT IN AN ORGANIZED HEALTH CARE EDUCATION/TRAINING PROGRAM

## 2023-04-06 PROCEDURE — 3079F DIAST BP 80-89 MM HG: CPT | Mod: CPTII,S$GLB,, | Performed by: STUDENT IN AN ORGANIZED HEALTH CARE EDUCATION/TRAINING PROGRAM

## 2023-04-06 PROCEDURE — 99214 PR OFFICE/OUTPT VISIT, EST, LEVL IV, 30-39 MIN: ICD-10-PCS | Mod: S$GLB,,, | Performed by: STUDENT IN AN ORGANIZED HEALTH CARE EDUCATION/TRAINING PROGRAM

## 2023-04-06 PROCEDURE — 1159F PR MEDICATION LIST DOCUMENTED IN MEDICAL RECORD: ICD-10-PCS | Mod: CPTII,S$GLB,, | Performed by: STUDENT IN AN ORGANIZED HEALTH CARE EDUCATION/TRAINING PROGRAM

## 2023-04-06 PROCEDURE — 3079F PR MOST RECENT DIASTOLIC BLOOD PRESSURE 80-89 MM HG: ICD-10-PCS | Mod: CPTII,S$GLB,, | Performed by: STUDENT IN AN ORGANIZED HEALTH CARE EDUCATION/TRAINING PROGRAM

## 2023-04-06 PROCEDURE — 72040 XR CERVICAL SPINE AP LATERAL: ICD-10-PCS | Mod: 26,,, | Performed by: RADIOLOGY

## 2023-04-06 PROCEDURE — 3008F BODY MASS INDEX DOCD: CPT | Mod: CPTII,S$GLB,, | Performed by: STUDENT IN AN ORGANIZED HEALTH CARE EDUCATION/TRAINING PROGRAM

## 2023-04-06 PROCEDURE — 72040 X-RAY EXAM NECK SPINE 2-3 VW: CPT | Mod: TC,PN

## 2023-04-06 PROCEDURE — 3075F SYST BP GE 130 - 139MM HG: CPT | Mod: CPTII,S$GLB,, | Performed by: STUDENT IN AN ORGANIZED HEALTH CARE EDUCATION/TRAINING PROGRAM

## 2023-04-06 PROCEDURE — 99999 PR PBB SHADOW E&M-EST. PATIENT-LVL III: CPT | Mod: PBBFAC,,, | Performed by: STUDENT IN AN ORGANIZED HEALTH CARE EDUCATION/TRAINING PROGRAM

## 2023-04-06 PROCEDURE — 3008F PR BODY MASS INDEX (BMI) DOCUMENTED: ICD-10-PCS | Mod: CPTII,S$GLB,, | Performed by: STUDENT IN AN ORGANIZED HEALTH CARE EDUCATION/TRAINING PROGRAM

## 2023-04-06 PROCEDURE — 99214 OFFICE O/P EST MOD 30 MIN: CPT | Mod: S$GLB,,, | Performed by: STUDENT IN AN ORGANIZED HEALTH CARE EDUCATION/TRAINING PROGRAM

## 2023-04-06 PROCEDURE — 72040 X-RAY EXAM NECK SPINE 2-3 VW: CPT | Mod: 26,,, | Performed by: RADIOLOGY

## 2023-04-06 PROCEDURE — 3075F PR MOST RECENT SYSTOLIC BLOOD PRESS GE 130-139MM HG: ICD-10-PCS | Mod: CPTII,S$GLB,, | Performed by: STUDENT IN AN ORGANIZED HEALTH CARE EDUCATION/TRAINING PROGRAM

## 2023-04-06 PROCEDURE — 72070 X-RAY EXAM THORAC SPINE 2VWS: CPT | Mod: 26,,, | Performed by: RADIOLOGY

## 2023-04-06 PROCEDURE — 72070 X-RAY EXAM THORAC SPINE 2VWS: CPT | Mod: TC,PN

## 2023-04-06 PROCEDURE — 1159F MED LIST DOCD IN RCRD: CPT | Mod: CPTII,S$GLB,, | Performed by: STUDENT IN AN ORGANIZED HEALTH CARE EDUCATION/TRAINING PROGRAM

## 2023-04-06 NOTE — PROGRESS NOTES
Plan:     Renea was seen today for follow-up.    Diagnoses and all orders for this visit:    Cervical radiculopathy  -     X-Ray Thoracic Spine AP Lateral; Future  -     X-Ray Cervical Spine AP And Lateral; Future  -     Ambulatory referral/consult to Physical/Occupational Therapy; Future    Paresthesia of left upper extremity  -     X-Ray Thoracic Spine AP Lateral; Future  -     X-Ray Cervical Spine AP And Lateral; Future  -     Ambulatory referral/consult to Physical/Occupational Therapy; Future    Paresthesia of right upper extremity  -     X-Ray Thoracic Spine AP Lateral; Future  -     X-Ray Cervical Spine AP And Lateral; Future  -     Ambulatory referral/consult to Physical/Occupational Therapy; Future       Follow up in about 6 weeks (around 5/18/2023), or if symptoms worsen or fail to improve, for repeat thyroid labs.    Amy Spain MD  04/06/2023    Subjective:      Patient ID: Renea Cox is a 27 y.o. female    Chief Complaint   Patient presents with    Follow-up     2 wk f/u     HPI  27 y.o. female with a PMHx as documented below presents to clinic today for the following:    Pt continues to endorse upper back/neck pain associated w/ numbness/tingling sensation of the bilateral hands, specifically the 4th and 5th digits. Pt initially evaluated for this concern at last clinic appointment (3/23/23). She was prescribed tizanidine but did not take it due to concern for side effects. Reports taking Excedrin and Tylenol PRN with temporary relief of symptoms.     Blood pressure today wnl.    Reviewed most recent lab results - all questions answered, pt expressed understanding.     Subclinical hypothyroidism  - TSH 4.131, free T4 0.92 (3/31/23)  - Plan to repeat labs in 6 weeks    Elevated total bilirubin:   - Total bili 1.2 (3/31/23), down from 1.6 (2 years ago, 10/23/20)    Review of Systems   Constitutional:  Negative for chills and fever.   Respiratory:  Negative for shortness of breath.     Cardiovascular:  Negative for chest pain.   Gastrointestinal:  Negative for abdominal pain, constipation, diarrhea, nausea and vomiting.   Musculoskeletal:  Positive for myalgias and neck pain.   Neurological:  Positive for tingling.     Current Outpatient Medications   Medication Instructions    cetirizine (ZYRTEC) 5 mg, Oral, Daily      Past Medical History:   Diagnosis Date    Gestational diabetes mellitus (GDM) in third trimester 12/18/2018    Pancreatitis      Past Surgical History:   Procedure Laterality Date    ADENOIDECTOMY      ADENOIDECTOMY  2000    CHOLECYSTECTOMY      SKIN GRAFT      TONSILLECTOMY      WISDOM TOOTH EXTRACTION       Review of patient's allergies indicates:   Allergen Reactions    Penicillins Hives     Family History   Problem Relation Age of Onset    Diabetes Maternal Grandmother         Type 2    Heart disease Maternal Grandmother     Miscarriages / Stillbirths Maternal Grandmother     Heart disease Maternal Grandfather     Arthritis Maternal Grandfather     Cancer Maternal Grandfather         Skin    Hearing loss Maternal Grandfather     Arthritis Mother     Cancer Mother         Skin    Depression Mother     Drug abuse Mother     Asthma Sister     Asthma Brother     Drug abuse Brother     Cancer Maternal Uncle         Esophagus    Depression Father      Social History     Tobacco Use    Smoking status: Never    Smokeless tobacco: Never   Substance Use Topics    Alcohol use: No    Drug use: Never     Currently on File with [facility]:   Most Recent Immunizations   Administered Date(s) Administered    COVID-19, MRNA, LN-S, PF (Pfizer) (Purple Cap) 04/27/2021    DTaP 09/25/1998    HIB 01/11/1996    HPV 9-Valent 04/30/2021    Hepatitis B, Pediatric/Adolescent 07/26/1994    Influenza 12/10/2013    Influenza - Quadrivalent - PF *Preferred* (6 months and older) 11/01/2019    MMR 08/25/1998    Meningococcal Conjugate (MCV4P) 07/24/2012    OPV 08/25/1998    PPD Test 11/09/1998     Pneumococcal Polysaccharide - 23 Valent 07/31/2018    Tdap 12/20/2018    Varicella 01/07/2010     Objective:      Vitals:    04/06/23 1339   BP: 132/80   BP Location: Right arm   Patient Position: Sitting   Weight: 70.4 kg (155 lb 1.5 oz)     Body mass index is 28.37 kg/m².    Physical Exam  Vitals reviewed.   Constitutional:       General: She is not in acute distress.  HENT:      Head: Normocephalic and atraumatic.   Cardiovascular:      Rate and Rhythm: Normal rate.   Pulmonary:      Effort: Pulmonary effort is normal. No respiratory distress.   Musculoskeletal:         General: Tenderness (paraspinal mm of upper thoracic/lower cervical spine bilaterally) present.   Neurological:      General: No focal deficit present.      Mental Status: She is alert and oriented to person, place, and time. Mental status is at baseline.      Assessment:       1. Cervical radiculopathy    2. Paresthesia of left upper extremity    3. Paresthesia of right upper extremity        Amy Spain MD  [facility]  Office: [phone]   Fax: [phone]  04/06/2023      Disclaimer: This note was partly generated using dictation software which may occasionally result in transcription errors.    Total time spent on this encounter includes face to face time and non-face to face time preparing to see the patient (eg, review of tests), obtaining and/or reviewing separately obtained history, documenting clinical information in the electronic or other health record, independently interpreting results, and communicating results to the patient/family/caregiver, or care coordinator.

## 2023-04-20 ENCOUNTER — CLINICAL SUPPORT (OUTPATIENT)
Dept: REHABILITATION | Facility: HOSPITAL | Age: 28
End: 2023-04-20
Payer: COMMERCIAL

## 2023-04-20 DIAGNOSIS — R29.898 WEAKNESS OF BOTH UPPER EXTREMITIES: ICD-10-CM

## 2023-04-20 DIAGNOSIS — R20.2 PARESTHESIA OF LEFT UPPER EXTREMITY: ICD-10-CM

## 2023-04-20 DIAGNOSIS — M54.2 CERVICALGIA: ICD-10-CM

## 2023-04-20 DIAGNOSIS — M54.12 CERVICAL RADICULOPATHY: ICD-10-CM

## 2023-04-20 DIAGNOSIS — R68.89 DECREASED ACTIVITY TOLERANCE: ICD-10-CM

## 2023-04-20 DIAGNOSIS — R20.2 PARESTHESIA OF RIGHT UPPER EXTREMITY: ICD-10-CM

## 2023-04-20 DIAGNOSIS — R29.898 DECREASED RANGE OF MOTION OF NECK: ICD-10-CM

## 2023-04-20 PROCEDURE — 97161 PT EVAL LOW COMPLEX 20 MIN: CPT | Mod: PO

## 2023-04-20 PROCEDURE — 97140 MANUAL THERAPY 1/> REGIONS: CPT | Mod: PO

## 2023-04-20 PROCEDURE — 97110 THERAPEUTIC EXERCISES: CPT | Mod: PO

## 2023-04-21 PROBLEM — R68.89 DECREASED ACTIVITY TOLERANCE: Status: ACTIVE | Noted: 2023-04-21

## 2023-04-21 PROBLEM — M54.2 CERVICALGIA: Status: ACTIVE | Noted: 2023-04-21

## 2023-04-21 PROBLEM — R29.898 WEAKNESS OF BOTH UPPER EXTREMITIES: Status: ACTIVE | Noted: 2023-04-21

## 2023-04-21 PROBLEM — R29.898 DECREASED RANGE OF MOTION OF NECK: Status: ACTIVE | Noted: 2023-04-21

## 2023-04-25 NOTE — PROGRESS NOTES
[facility]   Physical Therapy Treatment Note     Name: Renea Cox  Clinic Number: [id number]    Therapy Diagnosis:   Encounter Diagnoses   Name Primary?    Cervicalgia Yes    Decreased range of motion of neck     Weakness of both upper extremities     Decreased activity tolerance      Physician: Amy Spain MD    Visit Date: 4/26/2023  Physician Orders: PT Eval and Treat   Medical Diagnosis from Referral: M54.12 (ICD-10-CM) - Cervical radiculopathy R20.2 (ICD-10-CM) - Paresthesia of left upper extremity R20.2 (ICD-10-CM) - Paresthesia of right upper extremity   Evaluation Date: 4/20/2023  Authorization Period Expiration: 04/05/2024   Plan of Care Expiration: 6/30/2023  Visit # / Visits authorized: 1/ 20    PTA Visit #: 1/5     Precautions: Standard     Time In: 0730 AM  Time Out: 0823 AM  Total Billable Time: 40 minutes    SUBJECTIVE     Pt reports: her neck is okay today. No complaints of radicular symptoms. She was compliant with home exercise program.  Response to previous treatment: no adverse effects   Functional change: too soon to determine     Pain: 0/10  Location: left neck      OBJECTIVE     Objective Measures updated at progress report unless specified.     Treatment     Renea received the treatments listed below:      therapeutic exercises to develop strength, endurance, ROM, flexibility, posture, and core stabilization for 10 minutes including:  Seated upper trap stretch x 30 sec x 2   Corner pec stretch, 3x 30s   Shoulder rolls, 1x10 each way  Seated thoracic ext, 1x10    manual therapy techniques: Soft tissue Mobilization were applied to the: cervical musculature for 15 minutes, including:  STM to B upper traps   Cervical distraction  Suboccipital release    neuromuscular re-education activities to improve: Coordination, Sense, Proprioception, and Posture for 25 minutes. The following activities were included:  Supine chin tucks 2x10, 3 sec hold    Seated chin tucks 2x10, 3 sec hold   Seated scapular retractions 2x10  Seated no money (green TB) 2x10  Standing rows (green TB) 2x10  Standing extensions (Green TB) 2x10    Patient Education and Home Exercises     Home Exercises Provided and Patient Education Provided     Education provided:   - HEP compliance    Written Home Exercises Provided: Patient instructed to cont prior HEP. Exercises were reviewed and Renea was able to demonstrate them prior to the end of the session.  Renea demonstrated good  understanding of the education provided. See EMR under Patient Instructions for exercises provided during therapy sessions    ASSESSMENT     Renea tender with palpation to (B) upper traps but this most pronounced on (L) side. Patient able to perform chin tucks without adverse effects but cues provided for return to neutral posture rather than extension. Heavy focus on victor manuel-scapular motor control and correction of forward head/shoulder posture.     Renea Is progressing well towards her goals.   Pt prognosis is Good.     Pt will continue to benefit from skilled outpatient physical therapy to address the deficits listed in the problem list box on initial evaluation, provide pt/family education and to maximize pt's level of independence in the home and community environment.     Pt's spiritual, cultural and educational needs considered and pt agreeable to plan of care and goals.     Anticipated barriers to physical therapy: none    Goals:   Short Term Goals (4 Weeks):   - Pt will demonstrate compliance with initial home exercise program as prescribed by physical therapist to improve independence with management of condition.  - Pt to improve active range of motion in cervical spine to 0% limitations to allow for improved functional mobility.  - Pt to increase strength to at least 4+/5 of muscles tested to allow for improvement in functional activities.  - Pt to report cervical pain of <5/10 at worst to improve  tolerance to ADLs and work.     Long Term Goals (8 Weeks):   - Pt to achieve <36% limitation as measured by the FOTO to demonstrate decreased disability.  - Pt to increase strength to at least 5/5 of muscles tested to allow for improvement in functional activities.  - Pt to report cervical pain of <3/10 at worst to improve tolerance to ADLs and work.    PLAN     Continue current POC with emphasis on improving postural awareness.     Simran Ny, PTA

## 2023-04-26 ENCOUNTER — CLINICAL SUPPORT (OUTPATIENT)
Dept: REHABILITATION | Facility: HOSPITAL | Age: 28
End: 2023-04-26
Payer: COMMERCIAL

## 2023-04-26 DIAGNOSIS — M54.2 CERVICALGIA: Primary | ICD-10-CM

## 2023-04-26 DIAGNOSIS — R29.898 WEAKNESS OF BOTH UPPER EXTREMITIES: ICD-10-CM

## 2023-04-26 DIAGNOSIS — R68.89 DECREASED ACTIVITY TOLERANCE: ICD-10-CM

## 2023-04-26 DIAGNOSIS — R29.898 DECREASED RANGE OF MOTION OF NECK: ICD-10-CM

## 2023-04-26 PROCEDURE — 97140 MANUAL THERAPY 1/> REGIONS: CPT | Mod: PO,CQ

## 2023-04-26 PROCEDURE — 97112 NEUROMUSCULAR REEDUCATION: CPT | Mod: PO,CQ

## 2023-04-28 ENCOUNTER — CLINICAL SUPPORT (OUTPATIENT)
Dept: REHABILITATION | Facility: HOSPITAL | Age: 28
End: 2023-04-28
Payer: COMMERCIAL

## 2023-04-28 DIAGNOSIS — M54.2 CERVICALGIA: Primary | ICD-10-CM

## 2023-04-28 DIAGNOSIS — R68.89 DECREASED ACTIVITY TOLERANCE: ICD-10-CM

## 2023-04-28 DIAGNOSIS — R29.898 DECREASED RANGE OF MOTION OF NECK: ICD-10-CM

## 2023-04-28 DIAGNOSIS — R29.898 WEAKNESS OF BOTH UPPER EXTREMITIES: ICD-10-CM

## 2023-04-28 PROCEDURE — 97140 MANUAL THERAPY 1/> REGIONS: CPT | Mod: PO,CQ

## 2023-04-28 PROCEDURE — 97112 NEUROMUSCULAR REEDUCATION: CPT | Mod: PO,CQ

## 2023-04-28 PROCEDURE — 97110 THERAPEUTIC EXERCISES: CPT | Mod: PO,CQ

## 2023-04-28 NOTE — PROGRESS NOTES
[facility]   Physical Therapy Treatment Note     Name: Renea Cox  Clinic Number: [id number]    Therapy Diagnosis:   Encounter Diagnoses   Name Primary?    Cervicalgia Yes    Decreased range of motion of neck     Weakness of both upper extremities     Decreased activity tolerance        Physician: Amy Spain MD    Visit Date: 4/28/2023  Physician Orders: PT Eval and Treat   Medical Diagnosis from Referral: M54.12 (ICD-10-CM) - Cervical radiculopathy R20.2 (ICD-10-CM) - Paresthesia of left upper extremity R20.2 (ICD-10-CM) - Paresthesia of right upper extremity   Evaluation Date: 4/20/2023  Authorization Period Expiration: 04/05/2024   Plan of Care Expiration: 6/30/2023  Visit # / Visits authorized: 2/ 20    PTA Visit #: 2/5     Precautions: Standard     Time In: 1000 AM  Time Out: 1053 AM  Total Billable Time: 53 minutes    SUBJECTIVE     Pt reports: the left side of her neck is sore this morning. Patient states she slept on the couch last night and feels this is contributing to increased soreness. Patient states she had soreness after last session but not increased pain. She was compliant with home exercise program.  Response to previous treatment: no adverse effects   Functional change: too soon to determine     Pain: 3/10  Location: left neck      OBJECTIVE     Objective Measures updated at progress report unless specified.     Treatment     Renea received the treatments listed below:      therapeutic exercises to develop strength, endurance, ROM, flexibility, posture, and core stabilization for 10 minutes including:  UBE x 5 minutes (alternating forward andd retro)  Seated upper trap stretch x 30 sec x 2   Corner pec stretch, 3x 30s   Shoulder rolls, 1x10 each way (posterior)  Seated thoracic ext, 1x10    manual therapy techniques: Soft tissue Mobilization were applied to the: cervical musculature for 15 minutes, including:  STM to B upper traps   Cervical  distraction  Suboccipital release    neuromuscular re-education activities to improve: Coordination, Sense, Proprioception, and Posture for 25 minutes. The following activities were included:  Supine chin tucks 2x10, 3 sec hold   Seated chin tucks 2x10, 3 sec hold   Seated scapular retractions 2x10  Seated no money (green TB) 2x10  Standing rows (green TB) 3x10  Standing extensions (Green TB) 3x10    Patient Education and Home Exercises     Home Exercises Provided and Patient Education Provided     Education provided:   - HEP compliance    Written Home Exercises Provided: Patient instructed to cont prior HEP. Exercises were reviewed and eRnea was able to demonstrate them prior to the end of the session.  Renea demonstrated good  understanding of the education provided. See EMR under Patient Instructions for exercises provided during therapy sessions    ASSESSMENT     Renea tender with palpation to (B) upper traps but this most pronounced on (L) side. .Patient demonstrating improving control of chin tuck especially in seated position. Heavy focus on victor manuel-scapular motor control and correction of forward head/shoulder posture.     Renea Is progressing well towards her goals.   Pt prognosis is Good.     Pt will continue to benefit from skilled outpatient physical therapy to address the deficits listed in the problem list box on initial evaluation, provide pt/family education and to maximize pt's level of independence in the home and community environment.     Pt's spiritual, cultural and educational needs considered and pt agreeable to plan of care and goals.     Anticipated barriers to physical therapy: none    Goals:   Short Term Goals (4 Weeks):   - Pt will demonstrate compliance with initial home exercise program as prescribed by physical therapist to improve independence with management of condition.  - Pt to improve active range of motion in cervical spine to 0% limitations to allow for improved functional  mobility.  - Pt to increase strength to at least 4+/5 of muscles tested to allow for improvement in functional activities.  - Pt to report cervical pain of <5/10 at worst to improve tolerance to ADLs and work.     Long Term Goals (8 Weeks):   - Pt to achieve <36% limitation as measured by the FOTO to demonstrate decreased disability.  - Pt to increase strength to at least 5/5 of muscles tested to allow for improvement in functional activities.  - Pt to report cervical pain of <3/10 at worst to improve tolerance to ADLs and work.    PLAN     Continue current POC with emphasis on improving postural awareness.     Simran Ny, PTA

## 2023-05-03 ENCOUNTER — CLINICAL SUPPORT (OUTPATIENT)
Dept: REHABILITATION | Facility: HOSPITAL | Age: 28
End: 2023-05-03
Payer: COMMERCIAL

## 2023-05-03 DIAGNOSIS — M54.2 CERVICALGIA: Primary | ICD-10-CM

## 2023-05-03 DIAGNOSIS — R29.898 DECREASED RANGE OF MOTION OF NECK: ICD-10-CM

## 2023-05-03 DIAGNOSIS — R68.89 DECREASED ACTIVITY TOLERANCE: ICD-10-CM

## 2023-05-03 DIAGNOSIS — R29.898 WEAKNESS OF BOTH UPPER EXTREMITIES: ICD-10-CM

## 2023-05-03 PROCEDURE — 97110 THERAPEUTIC EXERCISES: CPT | Mod: PO,CQ

## 2023-05-03 PROCEDURE — 97140 MANUAL THERAPY 1/> REGIONS: CPT | Mod: PO,CQ

## 2023-05-03 PROCEDURE — 97112 NEUROMUSCULAR REEDUCATION: CPT | Mod: PO,CQ

## 2023-05-03 NOTE — PROGRESS NOTES
[facility]   Physical Therapy Treatment Note     Name: Renea Cox  Clinic Number: [id number]    Therapy Diagnosis:   Encounter Diagnoses   Name Primary?    Cervicalgia Yes    Decreased range of motion of neck     Weakness of both upper extremities     Decreased activity tolerance      Physician: Amy Spain MD    Visit Date: 5/3/2023  Physician Orders: PT Eval and Treat   Medical Diagnosis from Referral: M54.12 (ICD-10-CM) - Cervical radiculopathy R20.2 (ICD-10-CM) - Paresthesia of left upper extremity R20.2 (ICD-10-CM) - Paresthesia of right upper extremity   Evaluation Date: 4/20/2023  Authorization Period Expiration: 04/05/2024   Plan of Care Expiration: 6/30/2023  Visit # / Visits authorized: 3/ 20    PTA Visit #: 3/5     Precautions: Standard     Time In: 0921 AM  Time Out: 1015 AM  Total Billable Time: 54 minutes    SUBJECTIVE     Pt reports: 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 Patient states this is more localized to (R) side of neck. She was compliant with home exercise program.  Response to previous treatment: no adverse effects   Functional change: too soon to determine     Pain: 4/10  Location: left neck      OBJECTIVE     Objective Measures updated at progress report unless specified.     Treatment     Renea received the treatments listed below:      therapeutic exercises to develop strength, endurance, ROM, flexibility, posture, and core stabilization for 10 minutes including:  UBE x 5 minutes (alternating forward andd retro)  Seated upper trap stretch x 30 sec x 2   Corner pec stretch, 3x 30s   Seated thoracic ext, 1x10    manual therapy techniques: Soft tissue Mobilization were applied to the: cervical musculature for 15 minutes, including:  STM to B upper traps   Cervical distraction  Suboccipital release    neuromuscular re-education activities to improve: Coordination, Sense, Proprioception, and Posture  for 25 minutes. The following activities were included:  Supine chin tucks 2x10, 3 sec hold   Seated chin tucks 2x10, 3 sec hold   Seated scapular retractions 2x10  Seated no money (green TB) 2x10  Standing rows (green TB) 3x10  Standing extensions (Green TB) 3x10    Patient Education and Home Exercises     Home Exercises Provided and Patient Education Provided     Education provided:   - HEP compliance    Written Home Exercises Provided: Patient instructed to cont prior HEP. Exercises were reviewed and Renea was able to demonstrate them prior to the end of the session.  Renea demonstrated good  understanding of the education provided. See EMR under Patient Instructions for exercises provided during therapy sessions    ASSESSMENT     Renea tender with palpation to (B) upper traps but this most pronounced on (R) side. Upper trap restriction much more palpable today. Patient able to demonstrate proper chin tuck without compensation. Progress victor manuel-scapular strengthening next treatment session to prone positioning to improve recruitment of scapular stabilizers. Continued to educated on the importance of rising from seated position every hour.     Renea Is progressing well towards her goals.   Pt prognosis is Good.     Pt will continue to benefit from skilled outpatient physical therapy to address the deficits listed in the problem list box on initial evaluation, provide pt/family education and to maximize pt's level of independence in the home and community environment.     Pt's spiritual, cultural and educational needs considered and pt agreeable to plan of care and goals.     Anticipated barriers to physical therapy: none    Goals:   Short Term Goals (4 Weeks):   - Pt will demonstrate compliance with initial home exercise program as prescribed by physical therapist to improve independence with management of condition.  - Pt to improve active range of motion in cervical spine to 0% limitations to allow for  improved functional mobility.  - Pt to increase strength to at least 4+/5 of muscles tested to allow for improvement in functional activities.  - Pt to report cervical pain of <5/10 at worst to improve tolerance to ADLs and work.     Long Term Goals (8 Weeks):   - Pt to achieve <36% limitation as measured by the FOTO to demonstrate decreased disability.  - Pt to increase strength to at least 5/5 of muscles tested to allow for improvement in functional activities.  - Pt to report cervical pain of <3/10 at worst to improve tolerance to ADLs and work.    PLAN     Continue current POC with emphasis on improving postural awareness.     Simran Ny, PTA

## 2023-05-05 ENCOUNTER — CLINICAL SUPPORT (OUTPATIENT)
Dept: REHABILITATION | Facility: HOSPITAL | Age: 28
End: 2023-05-05
Payer: COMMERCIAL

## 2023-05-05 DIAGNOSIS — R68.89 DECREASED ACTIVITY TOLERANCE: ICD-10-CM

## 2023-05-05 DIAGNOSIS — M54.2 CERVICALGIA: Primary | ICD-10-CM

## 2023-05-05 DIAGNOSIS — R29.898 DECREASED RANGE OF MOTION OF NECK: ICD-10-CM

## 2023-05-05 DIAGNOSIS — R29.898 WEAKNESS OF BOTH UPPER EXTREMITIES: ICD-10-CM

## 2023-05-05 PROCEDURE — 97140 MANUAL THERAPY 1/> REGIONS: CPT | Mod: PO

## 2023-05-05 PROCEDURE — 97112 NEUROMUSCULAR REEDUCATION: CPT | Mod: PO,CQ

## 2023-05-05 NOTE — PROGRESS NOTES
[facility]   Physical Therapy Treatment Note     Name: Renea Cox  Clinic Number: [id number]    Therapy Diagnosis:   Encounter Diagnoses   Name Primary?    Cervicalgia Yes    Decreased range of motion of neck     Weakness of both upper extremities     Decreased activity tolerance      Physician: Amy Spain MD    Visit Date: 5/5/2023  Physician Orders: PT Eval and Treat   Medical Diagnosis from Referral: M54.12 (ICD-10-CM) - Cervical radiculopathy R20.2 (ICD-10-CM) - Paresthesia of left upper extremity R20.2 (ICD-10-CM) - Paresthesia of right upper extremity   Evaluation Date: 4/20/2023  Authorization Period Expiration: 04/05/2024   Plan of Care Expiration: 6/30/2023  Visit # / Visits authorized: 4/ 20    PTA Visit #: 3/5     Precautions: Standard     Time In: 0753  AM  Time Out: 0820 AM  Total Billable Time: 25 minutes    SUBJECTIVE     Pt reports: The pain in her neck is more centralized today.  Pt reports she noticed numbness in the neck following last treatment that was also in the right arm. She was sitting when she noticed the numbness in the neck.  Pt reports she has had numbness in the arm before but not the neck.  This lasted the entire day but was improved the next morning.  She was compliant with home exercise program.  Response to previous treatment: numbness in the neck  Functional change: too soon to determine     Pain: 3/10  Location: bilateral neck      OBJECTIVE     Objective Measures updated at progress report unless specified.     Treatment     Renea received the treatments listed below:      therapeutic exercises to develop strength, endurance, ROM, flexibility, posture, and core stabilization for 5 minutes including:    UBE x 5 minutes (alternating forward and retro)  Seated upper trap stretch x 30 sec x 2   Corner pec stretch, 3x 30s   Seated thoracic ext, 1x10    manual therapy techniques: Soft tissue Mobilization were applied to the: cervical  musculature for 0 minutes, including:  STM to B upper traps   Cervical distraction  Suboccipital release    neuromuscular re-education activities to improve: Coordination, Sense, Proprioception, and Posture for 25 minutes. The following activities were included:  Supine chin tucks 3 min, 3 sec hold- cueing to not hold breathe  Supine chin retractions into pillow, 3 min  Seated chin tucks 2x10, 3 sec hold   Seated scapular retractions 2x10  Seated no money (green TB) 2x10  Standing rows (green TB) 3x10  Standing extensions (Green TB) 3x10    Patient Education and Home Exercises     Home Exercises Provided and Patient Education Provided     Education provided:   - HEP compliance    Written Home Exercises Provided: Patient instructed to cont prior HEP. Exercises were reviewed and Renea was able to demonstrate them prior to the end of the session.  Renea demonstrated good  understanding of the education provided. See EMR under Patient Instructions for exercises provided during therapy sessions    ASSESSMENT     Renea required verbal cueing and demonstration of cervical exercises and to avoid pushing too hard or holding her breath.  Pt reported no episodes of numbness throughout exercises.  Will try to progress strengthening next session.    Renea Is progressing well towards her goals.   Pt prognosis is Good.     Pt will continue to benefit from skilled outpatient physical therapy to address the deficits listed in the problem list box on initial evaluation, provide pt/family education and to maximize pt's level of independence in the home and community environment.     Pt's spiritual, cultural and educational needs considered and pt agreeable to plan of care and goals.     Anticipated barriers to physical therapy: none    Goals:   Short Term Goals (4 Weeks):   - Pt will demonstrate compliance with initial home exercise program as prescribed by physical therapist to improve independence with management of  condition.  - Pt to improve active range of motion in cervical spine to 0% limitations to allow for improved functional mobility.  - Pt to increase strength to at least 4+/5 of muscles tested to allow for improvement in functional activities.  - Pt to report cervical pain of <5/10 at worst to improve tolerance to ADLs and work.     Long Term Goals (8 Weeks):   - Pt to achieve <36% limitation as measured by the FOTO to demonstrate decreased disability.  - Pt to increase strength to at least 5/5 of muscles tested to allow for improvement in functional activities.  - Pt to report cervical pain of <3/10 at worst to improve tolerance to ADLs and work.    PLAN     Continue current POC with emphasis on improving postural awareness.     Ania Vega, PTA

## 2023-05-05 NOTE — PROGRESS NOTES
[facility]   Physical Therapy Treatment Note     Name: Renea Cox  Clinic Number: [id number]    Therapy Diagnosis:   Encounter Diagnoses   Name Primary?    Cervicalgia Yes    Decreased range of motion of neck     Weakness of both upper extremities     Decreased activity tolerance      Physician: Amy Spain MD    Visit Date: 5/5/2023  Physician Orders: PT Eval and Treat   Medical Diagnosis from Referral: M54.12 (ICD-10-CM) - Cervical radiculopathy R20.2 (ICD-10-CM) - Paresthesia of left upper extremity R20.2 (ICD-10-CM) - Paresthesia of right upper extremity   Evaluation Date: 4/20/2023  Authorization Period Expiration: 12/31/2023  Plan of Care Expiration: 6/30/2023  Visit # / Visits authorized: 4 / 20    PTA Visit #: 0/5     Precautions: Standard     Time In: 8:20  Time Out: 8:35  Total Billable Time: 15 minutes    SUBJECTIVE     Pt reports: She is doing ok. Her neck was sore yesterday and this morning. She is not having the numbness and tingling into her hand as much anymore.   Response to previous treatment: no adverse effects   Functional change: too soon to determine     Pain: 4/10  Location: left neck      OBJECTIVE     Objective Measures updated at progress report unless specified.     Treatment     Renea received the treatments listed below:      therapeutic exercises to develop strength, endurance, ROM, flexibility, posture, and core stabilization for 00 minutes including:  UBE x 5 minutes (alternating forward andd retro)  Seated upper trap stretch x 30 sec x 2   Corner pec stretch, 3x 30s   Seated thoracic ext, 1x10    manual therapy techniques: Soft tissue Mobilization were applied to the: cervical musculature for 15 minutes, including:  STM to B upper traps   Cervical distraction  Suboccipital release    Discussed the purpose, mechanism, and indications for dry needling with Renea . Patient was cleared of all precautions and contraindications and pt  signed written consent and gave verbal consent to dry needling Rx today.   Palpation used to determine dry needling sites. Pt rec'd dry needling in prone position to B upper traps, R C5/6/7/T1 with 0.30x0.50 mm needles w/ e-stim  Pt tolerated treatment well and was not in any distress at the completion of treatment.     neuromuscular re-education activities to improve: Coordination, Sense, Proprioception, and Posture for 00 minutes. The following activities were included:  Supine chin tucks 2x10, 3 sec hold   Seated chin tucks 2x10, 3 sec hold   Seated scapular retractions 2x10  Seated no money (green TB) 2x10  Standing rows (green TB) 3x10  Standing extensions (Green TB) 3x10    Patient Education and Home Exercises     Home Exercises Provided and Patient Education Provided     Education provided:   - HEP compliance    Written Home Exercises Provided: Patient instructed to cont prior HEP. Exercises were reviewed and Renea was able to demonstrate them prior to the end of the session.  Renea demonstrated good  understanding of the education provided. See EMR under Patient Instructions for exercises provided during therapy sessions    ASSESSMENT     Pt tolerated dry needling well. Will assess response and continue with dry needling as indicated.     Renea Is progressing well towards her goals.   Pt prognosis is Good.     Pt will continue to benefit from skilled outpatient physical therapy to address the deficits listed in the problem list box on initial evaluation, provide pt/family education and to maximize pt's level of independence in the home and community environment.     Pt's spiritual, cultural and educational needs considered and pt agreeable to plan of care and goals.     Anticipated barriers to physical therapy: none    Goals:   Short Term Goals (4 Weeks):   - Pt will demonstrate compliance with initial home exercise program as prescribed by physical therapist to improve independence with management of  condition.  - Pt to improve active range of motion in cervical spine to 0% limitations to allow for improved functional mobility.  - Pt to increase strength to at least 4+/5 of muscles tested to allow for improvement in functional activities.  - Pt to report cervical pain of <5/10 at worst to improve tolerance to ADLs and work.     Long Term Goals (8 Weeks):   - Pt to achieve <36% limitation as measured by the FOTO to demonstrate decreased disability.  - Pt to increase strength to at least 5/5 of muscles tested to allow for improvement in functional activities.  - Pt to report cervical pain of <3/10 at worst to improve tolerance to ADLs and work.    PLAN     Continue current POC with emphasis on improving postural awareness.     Monroe Taylor, PT

## 2023-05-10 ENCOUNTER — CLINICAL SUPPORT (OUTPATIENT)
Dept: REHABILITATION | Facility: HOSPITAL | Age: 28
End: 2023-05-10
Payer: COMMERCIAL

## 2023-05-10 DIAGNOSIS — R29.898 WEAKNESS OF BOTH UPPER EXTREMITIES: ICD-10-CM

## 2023-05-10 DIAGNOSIS — R29.898 DECREASED RANGE OF MOTION OF NECK: ICD-10-CM

## 2023-05-10 DIAGNOSIS — R68.89 DECREASED ACTIVITY TOLERANCE: ICD-10-CM

## 2023-05-10 DIAGNOSIS — M54.2 CERVICALGIA: Primary | ICD-10-CM

## 2023-05-10 PROCEDURE — 97140 MANUAL THERAPY 1/> REGIONS: CPT | Mod: PO

## 2023-05-10 NOTE — PROGRESS NOTES
[facility]   Physical Therapy Treatment Note     Name: Renea Cox  Clinic Number: [id number]    Therapy Diagnosis:   Encounter Diagnoses   Name Primary?    Cervicalgia Yes    Decreased range of motion of neck     Weakness of both upper extremities     Decreased activity tolerance      Physician: Amy Spain MD    Visit Date: 5/10/2023  Physician Orders: PT Eval and Treat   Medical Diagnosis from Referral: M54.12 (ICD-10-CM) - Cervical radiculopathy R20.2 (ICD-10-CM) - Paresthesia of left upper extremity R20.2 (ICD-10-CM) - Paresthesia of right upper extremity   Evaluation Date: 4/20/2023  Authorization Period Expiration: 12/31/2023  Plan of Care Expiration: 6/30/2023  Visit # / Visits authorized: 5 / 20    PTA Visit #: 0/5     Precautions: Standard     Time In: 11:15  Time Out: 11:45  Total Billable Time: 30 minutes    SUBJECTIVE     Pt reports: She did very well after last session. She did not having any problems over the weekend. She is feeling tight in the lower neck to start the session.   Response to previous treatment: no adverse effects   Functional change: too soon to determine     Pain: 1/10  Location: left neck      OBJECTIVE     Objective Measures updated at progress report unless specified.     Treatment     Renea received the treatments listed below:      therapeutic exercises to develop strength, endurance, ROM, flexibility, posture, and core stabilization for 05 minutes including:  UBE x 5 minutes (alternating forward andd retro)  Seated upper trap stretch x 30 sec x 2   Corner pec stretch, 3x 30s   Seated thoracic ext, 1x10    manual therapy techniques: Soft tissue Mobilization were applied to the: cervical musculature for 25 minutes, including:  STM to B upper traps   Cervical distraction  Suboccipital release    Discussed the purpose, mechanism, and indications for dry needling with Renea . Patient was cleared of all precautions and  contraindications and pt signed written consent and gave verbal consent to dry needling Rx today.   Palpation used to determine dry needling sites. Pt rec'd dry needling in prone position to B upper traps, R C5/6/7/T1 with 0.30x0.50 mm needles w/ e-stim  Pt tolerated treatment well and was not in any distress at the completion of treatment.     neuromuscular re-education activities to improve: Coordination, Sense, Proprioception, and Posture for 00 minutes. The following activities were included:  Supine chin tucks 2x10, 3 sec hold   Seated chin tucks 2x10, 3 sec hold   Seated scapular retractions 2x10  Seated no money (green TB) 2x10  Standing rows (green TB) 3x10  Standing extensions (Green TB) 3x10    Patient Education and Home Exercises     Home Exercises Provided and Patient Education Provided     Education provided:   - HEP compliance    Written Home Exercises Provided: Patient instructed to cont prior HEP. Exercises were reviewed and Renea was able to demonstrate them prior to the end of the session.  Renea demonstrated good  understanding of the education provided. See EMR under Patient Instructions for exercises provided during therapy sessions    ASSESSMENT     Pt tolerated dry needling well. Will assess response and continue with dry needling as indicated.     Renea Is progressing well towards her goals.   Pt prognosis is Good.     Pt will continue to benefit from skilled outpatient physical therapy to address the deficits listed in the problem list box on initial evaluation, provide pt/family education and to maximize pt's level of independence in the home and community environment.     Pt's spiritual, cultural and educational needs considered and pt agreeable to plan of care and goals.     Anticipated barriers to physical therapy: none    Goals:   Short Term Goals (4 Weeks):   - Pt will demonstrate compliance with initial home exercise program as prescribed by physical therapist to improve  independence with management of condition.  - Pt to improve active range of motion in cervical spine to 0% limitations to allow for improved functional mobility.  - Pt to increase strength to at least 4+/5 of muscles tested to allow for improvement in functional activities.  - Pt to report cervical pain of <5/10 at worst to improve tolerance to ADLs and work.     Long Term Goals (8 Weeks):   - Pt to achieve <36% limitation as measured by the FOTO to demonstrate decreased disability.  - Pt to increase strength to at least 5/5 of muscles tested to allow for improvement in functional activities.  - Pt to report cervical pain of <3/10 at worst to improve tolerance to ADLs and work.    PLAN     Continue current POC with emphasis on improving postural awareness.     Monroe Taylor, PT

## 2023-05-12 ENCOUNTER — CLINICAL SUPPORT (OUTPATIENT)
Dept: REHABILITATION | Facility: HOSPITAL | Age: 28
End: 2023-05-12
Payer: COMMERCIAL

## 2023-05-12 DIAGNOSIS — R68.89 DECREASED ACTIVITY TOLERANCE: ICD-10-CM

## 2023-05-12 DIAGNOSIS — R29.898 DECREASED RANGE OF MOTION OF NECK: ICD-10-CM

## 2023-05-12 DIAGNOSIS — R29.898 WEAKNESS OF BOTH UPPER EXTREMITIES: ICD-10-CM

## 2023-05-12 DIAGNOSIS — M54.2 CERVICALGIA: Primary | ICD-10-CM

## 2023-05-12 PROCEDURE — 97110 THERAPEUTIC EXERCISES: CPT | Mod: PO,CQ

## 2023-05-12 PROCEDURE — 97112 NEUROMUSCULAR REEDUCATION: CPT | Mod: PO,CQ

## 2023-05-12 NOTE — PROGRESS NOTES
[facility]   Physical Therapy Treatment Note     Name: Renea Cox  Clinic Number: [id number]    Therapy Diagnosis:   Encounter Diagnoses   Name Primary?    Cervicalgia Yes    Decreased range of motion of neck     Weakness of both upper extremities     Decreased activity tolerance      Physician: Amy Spain MD    Visit Date: 5/12/2023  Physician Orders: PT Eval and Treat   Medical Diagnosis from Referral: M54.12 (ICD-10-CM) - Cervical radiculopathy R20.2 (ICD-10-CM) - Paresthesia of left upper extremity R20.2 (ICD-10-CM) - Paresthesia of right upper extremity   Evaluation Date: 4/20/2023  Authorization Period Expiration: 12/31/2023  Plan of Care Expiration: 6/30/2023  Visit # / Visits authorized: 6/ 20    PTA Visit #: 1/5     Precautions: Standard     Time In: 1258 PM  Time Out: 1353  Total Billable Time: 30 minutes    SUBJECTIVE     Pt reports: her neck is feeling sore and tight today. States dry needling has been helping and she has started to notice a difference.  Response to previous treatment: no adverse effects   Functional change: too soon to determine     Pain: 3/10  Location: left neck      OBJECTIVE     Objective Measures updated at progress report unless specified.     Treatment     Renea received the treatments listed below:      therapeutic exercises to develop strength, endurance, ROM, flexibility, posture, and core stabilization for 10 minutes including:  UBE x 5 minutes (alternating forward andd retro)  Seated upper trap stretch x 30 sec x 2   Corner pec stretch, 3x 30s   Seated thoracic ext, 1x10    manual therapy techniques: Soft tissue Mobilization were applied to the: cervical musculature for 20 minutes, including:  STM to B upper traps   Cervical distraction  Suboccipital release  L scapula [facility]     Discussed the purpose, mechanism, and indications for dry needling with Renea . Patient was cleared of all precautions and contraindications and pt  signed written consent and gave verbal consent to dry needling Rx today.   Palpation used to determine dry needling sites. Pt rec'd dry needling in prone position to B upper traps, R C5/6/7/T1 with 0.30x0.50 mm needles w/ e-stim  Pt tolerated treatment well and was not in any distress at the completion of treatment.     neuromuscular re-education activities to improve: Coordination, Sense, Proprioception, and Posture for 25 minutes. The following activities were included:  Supine chin tucks 2x10, 3 sec hold   Seated chin tucks 2x10, 3 sec hold   Seated scapular retractions 2x10  Seated no money (green TB) 2x10  Standing rows (green TB) 3x10  Standing extensions (Green TB) 3x10    Patient Education and Home Exercises     Home Exercises Provided and Patient Education Provided     Education provided:   - HEP compliance    Written Home Exercises Provided: Patient instructed to cont prior HEP. Exercises were reviewed and Renea was able to demonstrate them prior to the end of the session.  Renea demonstrated good  understanding of the education provided. See EMR under Patient Instructions for exercises provided during therapy sessions    ASSESSMENT     Renea had positive results of decreased tightness following STM to upper traps, paraspinals, and L scapula. Patient with trigger point near rhomboid musculature with only minimal change achieved with manual therapy. Pt tolerated all stretches and exercise without any adverse effects. Tactile and verbal cueing was required to prevent excessive abduction during brugger exercise.     Renea Is progressing well towards her goals.   Pt prognosis is Good.     Pt will continue to benefit from skilled outpatient physical therapy to address the deficits listed in the problem list box on initial evaluation, provide pt/family education and to maximize pt's level of independence in the home and community environment.     Pt's spiritual, cultural and educational needs considered  and pt agreeable to plan of care and goals.     Anticipated barriers to physical therapy: none    Goals:   Short Term Goals (4 Weeks):   - Pt will demonstrate compliance with initial home exercise program as prescribed by physical therapist to improve independence with management of condition.  - Pt to improve active range of motion in cervical spine to 0% limitations to allow for improved functional mobility.  - Pt to increase strength to at least 4+/5 of muscles tested to allow for improvement in functional activities.  - Pt to report cervical pain of <5/10 at worst to improve tolerance to ADLs and work.     Long Term Goals (8 Weeks):   - Pt to achieve <36% limitation as measured by the FOTO to demonstrate decreased disability.  - Pt to increase strength to at least 5/5 of muscles tested to allow for improvement in functional activities.  - Pt to report cervical pain of <3/10 at worst to improve tolerance to ADLs and work.    PLAN     Continue current POC with emphasis on improving postural awareness.     Simran Ny, PTA

## 2023-05-17 ENCOUNTER — CLINICAL SUPPORT (OUTPATIENT)
Dept: REHABILITATION | Facility: HOSPITAL | Age: 28
End: 2023-05-17
Payer: COMMERCIAL

## 2023-05-17 DIAGNOSIS — R29.898 DECREASED RANGE OF MOTION OF NECK: ICD-10-CM

## 2023-05-17 DIAGNOSIS — R68.89 DECREASED ACTIVITY TOLERANCE: ICD-10-CM

## 2023-05-17 DIAGNOSIS — R29.898 WEAKNESS OF BOTH UPPER EXTREMITIES: ICD-10-CM

## 2023-05-17 DIAGNOSIS — M54.2 CERVICALGIA: Primary | ICD-10-CM

## 2023-05-17 PROCEDURE — 97140 MANUAL THERAPY 1/> REGIONS: CPT | Mod: PO

## 2023-05-17 NOTE — PROGRESS NOTES
[facility]   Physical Therapy Treatment Note     Name: Renea Cox  Clinic Number: [id number]    Therapy Diagnosis:   Encounter Diagnoses   Name Primary?    Cervicalgia Yes    Decreased range of motion of neck     Weakness of both upper extremities     Decreased activity tolerance      Physician: Amy Spain MD    Visit Date: 5/17/2023  Physician Orders: PT Eval and Treat   Medical Diagnosis from Referral: M54.12 (ICD-10-CM) - Cervical radiculopathy R20.2 (ICD-10-CM) - Paresthesia of left upper extremity R20.2 (ICD-10-CM) - Paresthesia of right upper extremity   Evaluation Date: 4/20/2023  Authorization Period Expiration: 12/31/2023  Plan of Care Expiration: 6/30/2023  Visit # / Visits authorized: 7 / 20    PTA Visit #: 0/5     Precautions: Standard     Time In: 11:15  Time Out: 11:40  Total Billable Time: 25 minutes     SUBJECTIVE     Pt reports: She is having a burning pain into her left shoulder today. She had to leave work early on Friday because of the pain in this shoulder.   Response to previous treatment: no adverse effects   Functional change: too soon to determine     Pain: 4/10  Location: left neck      OBJECTIVE     Objective Measures updated at progress report unless specified.     Treatment     Renea received the treatments listed below:      therapeutic exercises to develop strength, endurance, ROM, flexibility, posture, and core stabilization for 00 minutes including:  UBE x 4 minutes (alternating forward andd retro)  Seated upper trap stretch x 30 sec x 2   Corner pec stretch, 3x 30s   Seated thoracic ext, 1x10    manual therapy techniques: Soft tissue Mobilization were applied to the: cervical musculature for 23 minutes, including:  STM to B upper traps   Cervical distraction  Suboccipital release  L scapula STM     Discussed the purpose, mechanism, and indications for dry needling with Renea . Patient was cleared of all precautions and  contraindications and pt signed written consent and gave verbal consent to dry needling Rx today.   Palpation used to determine dry needling sites. Pt rec'd dry needling in prone position to B upper traps, R C5/6/7/T1 with 0.30x0.50 mm needles w/ e-stim  Pt tolerated treatment well and was not in any distress at the completion of treatment.     neuromuscular re-education activities to improve: Coordination, Sense, Proprioception, and Posture for 00 minutes. The following activities were included:  Supine chin tucks 2x10, 3 sec hold   Seated chin tucks 2x10, 3 sec hold   Seated scapular retractions 2x10  Seated no money (green TB) 2x10  Standing rows (green TB) 3x10  Standing extensions (Green TB) 3x10    Patient Education and Home Exercises     Home Exercises Provided and Patient Education Provided     Education provided:   - HEP compliance    Written Home Exercises Provided: Patient instructed to cont prior HEP. Exercises were reviewed and Renea was able to demonstrate them prior to the end of the session.  Renea demonstrated good  understanding of the education provided. See EMR under Patient Instructions for exercises provided during therapy sessions    ASSESSMENT     Pt reported feeling some increased soreness following dry needling. Will assess response and continue with dry needling as indicated.     Renea Is progressing well towards her goals.   Pt prognosis is Good.     Pt will continue to benefit from skilled outpatient physical therapy to address the deficits listed in the problem list box on initial evaluation, provide pt/family education and to maximize pt's level of independence in the home and community environment.     Pt's spiritual, cultural and educational needs considered and pt agreeable to plan of care and goals.     Anticipated barriers to physical therapy: none    Goals:   Short Term Goals (4 Weeks):   - Pt will demonstrate compliance with initial home exercise program as prescribed by  physical therapist to improve independence with management of condition.  - Pt to improve active range of motion in cervical spine to 0% limitations to allow for improved functional mobility.  - Pt to increase strength to at least 4+/5 of muscles tested to allow for improvement in functional activities.  - Pt to report cervical pain of <5/10 at worst to improve tolerance to ADLs and work.     Long Term Goals (8 Weeks):   - Pt to achieve <36% limitation as measured by the FOTO to demonstrate decreased disability.  - Pt to increase strength to at least 5/5 of muscles tested to allow for improvement in functional activities.  - Pt to report cervical pain of <3/10 at worst to improve tolerance to ADLs and work.    PLAN     Continue current POC with emphasis on improving postural awareness.     Monroe Taylor, PT

## 2023-05-19 ENCOUNTER — CLINICAL SUPPORT (OUTPATIENT)
Dept: REHABILITATION | Facility: HOSPITAL | Age: 28
End: 2023-05-19
Payer: COMMERCIAL

## 2023-05-19 DIAGNOSIS — M54.2 CERVICALGIA: Primary | ICD-10-CM

## 2023-05-19 DIAGNOSIS — R29.898 DECREASED RANGE OF MOTION OF NECK: ICD-10-CM

## 2023-05-19 DIAGNOSIS — R29.898 WEAKNESS OF BOTH UPPER EXTREMITIES: ICD-10-CM

## 2023-05-19 DIAGNOSIS — R68.89 DECREASED ACTIVITY TOLERANCE: ICD-10-CM

## 2023-05-19 PROCEDURE — 97140 MANUAL THERAPY 1/> REGIONS: CPT | Mod: PO

## 2023-05-19 NOTE — PLAN OF CARE
[facility]  Physical Therapy Re-Assessment    Name: Renea Cox  Clinic Number: [id number]    Therapy Diagnosis:   Encounter Diagnoses   Name Primary?    Cervicalgia Yes    Decreased range of motion of neck     Weakness of both upper extremities     Decreased activity tolerance      Physician: Amy Spain MD    Physician Orders: PT Eval and Treat   Medical Diagnosis from Referral: M54.12 (ICD-10-CM) - Cervical radiculopathy R20.2 (ICD-10-CM) - Paresthesia of left upper extremity R20.2 (ICD-10-CM) - Paresthesia of right upper extremity   Evaluation Date: 4/20/2023  Authorization Period Expiration: 12/31/2023  Plan of Care Expiration: 6/30/2023  Visit # / Visits authorized: 8 / 20     Time In: 8:30  Time Out: 9:00  Total Billable Time: 25 minutes    Precautions: Standard    Subjective   Date of onset: March 2023  History of current condition - Renea reports: Pt reports she has felt a lot better since starting PT and dry needling. She is no longer waking up with pain. She feels like she can move her neck around more. Overall happy with progress so far.      Medical History:   Past Medical History:   Diagnosis Date    Gestational diabetes mellitus (GDM) in third trimester 12/18/2018    Pancreatitis        Surgical History:   Renea Cox  has a past surgical history that includes Cholecystectomy; Tonsillectomy; Adenoidectomy; [place] tooth extraction; Skin graft; and Adenoidectomy (2000).    Medications:   Renea has a current medication list which includes the following prescription(s): cetirizine.    Allergies:   Review of patient's allergies indicates:   Allergen Reactions    Penicillins Hives        Imaging,   xray: 04/06/2023   FINDINGS: There is mild reversal of the normal cervical lordosis which could relate to neck muscle spasm.  There is mild anterior marginal osteophyte formation at C6.  No acute cervical spine fracture or osseous destructive process.   The odontoid process is intact.  No widening of the anterior atlantodental interval.  No spondylolisthesis.  No prevertebral soft tissue swelling.  The airway is patent.  The lung apices are clear.    Xray: 04/06/2023   FINDINGS: The upper thoracic vertebral bodies are not well evaluated.  There is minor degenerative osteophyte formation in the lower thoracic spine.  There is minimal anterior wedging of the T12 vertebral body, likely congenital/developmental variation.  No acute thoracic compression fracture appreciated there is an 11° dextroscoliotic curvature of the lower thoracic spine as measured from the superior endplate of T4 to the inferior endplate of T12.  There are surgical clips present in the right upper quadrant of the abdomen which likely relate to previous cholecystectomy.  The left and right chest are clear where visualized.    Prior Therapy: No  Social History: Pt lives with her child  Occupation:   Prior Level of Function: Independent in all ADLs  Current Level of Function: Remains independent     Pain:  Current 3/10, worst 4/10, best 0/10   Location: bilateral neck and into bilateral arms  Description: numbness, tingling, throbbing, pressure  Aggravating Factors: sitting with elbows on the desk  Easing Factors: rest    Pts goals: Get back to normal    Red Flag Screening:   Cough  Sneeze  Strain: (--)  Bladder/ bowel: (--)  Falls: (--)  Night pain: (+)  Unexplained weight loss: (--)  General health: No isgns of distress      Objective     Observation: Sits with rounded shoulders and forward head posture.     Cervical Range of Motion:    % Limitation Pain   Flexion 0 No     Extension 0 No     Right Rotation 0 No     Left Rotation 0 No     Right Side Bending 0 No   Left Side Bending 0 No      Shoulder Range of Motion:   Shoulder Left Right   Flexion 180 180   Abduction 180 180     Upper Extremity Strength  Left UE  Right UE    Shoulder flexion: 5/5 Shoulder flexion: 5/5   Shoulder  Abduction: 5/5 Shoulder abduction: 5/5   Elbow flexion: 5/5 Elbow flexion: 5/5   Elbow extension: 5/5 Elbow extension: 5/5   Wrist extension: 5/5 Wrist extension: 5/5     Special Tests:  Distraction (+)   Compression (-)   Sharp-Hilary (-)   VA test (-)   Lateral Flexion Alar Ligament (-)     Joint Mobility: Normal cervical mobility     Palpation: TTP in B upper trap and cervical araspinals        CMS Impairment/Limitation/Restriction for FOTO NECK Survey    Therapist reviewed FOTO scores for Renea Cox on 5/19/2023.   FOTO documents entered into [employer] - see Media section.    Limitation Score: 34%  Category: Mobility       TREATMENT     See separate treatment note.       Home Exercises and Patient Education Provided    Education provided:   - Role of PT, PT POC, PT diagnosis, PT prognosis, HEP    Written Home Exercises Provided: yes.  Exercises were reviewed and Renea was able to demonstrate them prior to the end of the session.  Renea demonstrated good  understanding of the education provided.     See EMR under Patient Instructions for exercises provided 4/20/2023.    Assessment   Renea is a 27 y.o. female referred to outpatient Physical Therapy with a medical diagnosis of M54.12 (ICD-10-CM) - Cervical radiculopathy R20.2 (ICD-10-CM) - Paresthesia of left upper extremity R20.2 (ICD-10-CM) - Paresthesia of right upper extremity.   Pt has made good progress since starting PT. Improved cervical ROM and BUE strength. Continues to have increased thoracic kyphosis and forward head posture. Will continue to benefit from skilled PT to address remaining deficits in pain, posture, and functional mobility.     Pt prognosis is Good.   Pt will benefit from skilled outpatient Physical Therapy to address the deficits stated above and in the chart below, provide pt/family education, and to maximize pt's level of independence.     Plan of care discussed with patient: Yes  Pt's spiritual, cultural and educational  needs considered and patient is agreeable to the plan of care and goals as stated below:     Anticipated Barriers for therapy: None    Medical Necessity is demonstrated by the following  History  Co-morbidities and personal factors that may impact the plan of care Co-morbidities:   As noted above    Personal Factors:   no deficits     low   Examination  Body Structures and Functions, activity limitations and participation restrictions that may impact the plan of care Body Regions:   neck  upper extremities    Body Systems:    ROM  strength  motor control    Participation Restrictions:   None    Activity limitations:   Learning and applying knowledge  no deficits    General Tasks and Commands  no deficits    Communication  no deficits    Mobility  lifting and carrying objects    Self care  no deficits    Domestic Life  doing house work (cleaning house, washing dishes, laundry)    Interactions/Relationships  no deficits    Life Areas  no deficits    Community and Social Life  community life  recreation and leisure         moderate   Clinical Presentation stable and uncomplicated low   Decision Making/ Complexity Score: low     Goals:  Short Term Goals (4 Weeks):   - Pt will demonstrate compliance with initial home exercise program as prescribed by physical therapist to improve independence with management of condition. - MET  - Pt to improve active range of motion in cervical spine to 0% limitations to allow for improved functional mobility. - MET  - Pt to increase strength to at least 4+/5 of muscles tested to allow for improvement in functional activities. - MET  - Pt to report cervical pain of <5/10 at worst to improve tolerance to ADLs and work. - MET    Long Term Goals (8 Weeks):   - Pt to achieve <36% limitation as measured by the FOTO to demonstrate decreased disability. - MET  - Pt to increase strength to at least 5/5 of muscles tested to allow for improvement in functional activities. - MET  - Pt to report  cervical pain of <3/10 at worst to improve tolerance to ADLs and work. - NOT MET    Plan   Plan of care Certification: 4/20/2023 to 6/30/2023.    Outpatient Physical Therapy 2 times weekly for 8 weeks to include the following interventions: Aquatic Therapy, Cervical/Lumbar Traction, Electrical Stimulation  , Manual Therapy, Moist Heat/ Ice, Neuromuscular Re-ed, Patient Education, Therapeutic Activities, and Therapeutic Exercise.     Monroe Taylor, PT

## 2023-05-19 NOTE — PROGRESS NOTES
[facility]   Physical Therapy Treatment Note     Name: Renea Cox  Clinic Number: [id number]    Therapy Diagnosis:   Encounter Diagnoses   Name Primary?    Cervicalgia Yes    Decreased range of motion of neck     Weakness of both upper extremities     Decreased activity tolerance      Physician: Amy Spain MD    Visit Date: 5/19/2023  Physician Orders: PT Eval and Treat   Medical Diagnosis from Referral: M54.12 (ICD-10-CM) - Cervical radiculopathy R20.2 (ICD-10-CM) - Paresthesia of left upper extremity R20.2 (ICD-10-CM) - Paresthesia of right upper extremity   Evaluation Date: 4/20/2023  Authorization Period Expiration: 12/31/2023  Plan of Care Expiration: 6/30/2023  Visit # / Visits authorized: 8 / 20    PTA Visit #: 0/5     Re-Assessment due: 6/19/2023    Precautions: Standard     Time In: 8:30  Time Out: 9:00  Total Billable Time: 25 minutes     SUBJECTIVE     Pt reports: Pt reports she has felt a lot better since starting PT and dry needling. She is no longer waking up with pain. She feels like she can move her neck around more. Overall happy with progress so far.   Response to previous treatment: no adverse effects   Functional change: too soon to determine     Pain: 3/10  Location: left neck      OBJECTIVE     Objective Measures updated at progress report unless specified.     Treatment     Renea received the treatments listed below:      therapeutic exercises to develop strength, endurance, ROM, flexibility, posture, and core stabilization for 00 minutes including:  UBE x 4 minutes (alternating forward andd retro)  Seated upper trap stretch x 30 sec x 2   Corner pec stretch, 3x 30s   Seated thoracic ext, 1x10    manual therapy techniques: Soft tissue Mobilization were applied to the: cervical musculature for 23 minutes, including:  STM to B upper traps   Cervical distraction  Suboccipital release  L scapula STM     Discussed the purpose, mechanism, and  indications for dry needling with Renea . Patient was cleared of all precautions and contraindications and pt signed written consent and gave verbal consent to dry needling Rx today.   Palpation used to determine dry needling sites. Pt rec'd dry needling in prone position to B upper traps, R C5/6/7/T1 with 0.30x0.50 mm needles w/ e-stim  Pt tolerated treatment well and was not in any distress at the completion of treatment.     neuromuscular re-education activities to improve: Coordination, Sense, Proprioception, and Posture for 00 minutes. The following activities were included:  Supine chin tucks 2x10, 3 sec hold   Seated chin tucks 2x10, 3 sec hold   Seated scapular retractions 2x10  Seated no money (green TB) 2x10  Standing rows (green TB) 3x10  Standing extensions (Green TB) 3x10    Patient Education and Home Exercises     Home Exercises Provided and Patient Education Provided     Education provided:   - HEP compliance    Written Home Exercises Provided: Patient instructed to cont prior HEP. Exercises were reviewed and Renea was able to demonstrate them prior to the end of the session.  Renea demonstrated good  understanding of the education provided. See EMR under Patient Instructions for exercises provided during therapy sessions    ASSESSMENT     Pt has made good progress since starting PT. Improved cervical ROM and BUE strength. Continues to have increased thoracic kyphosis and forward head posture. Will continue to benefit from skilled PT to address remaining deficits in pain, posture, and functional mobility.     Renea Is progressing well towards her goals.   Pt prognosis is Good.     Pt will continue to benefit from skilled outpatient physical therapy to address the deficits listed in the problem list box on initial evaluation, provide pt/family education and to maximize pt's level of independence in the home and community environment.     Pt's spiritual, cultural and educational needs considered  and pt agreeable to plan of care and goals.     Anticipated barriers to physical therapy: none    Goals:   Short Term Goals (4 Weeks):   - Pt will demonstrate compliance with initial home exercise program as prescribed by physical therapist to improve independence with management of condition. - MET  - Pt to improve active range of motion in cervical spine to 0% limitations to allow for improved functional mobility. - MET  - Pt to increase strength to at least 4+/5 of muscles tested to allow for improvement in functional activities. - MET  - Pt to report cervical pain of <5/10 at worst to improve tolerance to ADLs and work. - MET    Long Term Goals (8 Weeks):   - Pt to achieve <36% limitation as measured by the FOTO to demonstrate decreased disability. - MET  - Pt to increase strength to at least 5/5 of muscles tested to allow for improvement in functional activities. - MET  - Pt to report cervical pain of <3/10 at worst to improve tolerance to ADLs and work. - NOT MET    PLAN     Continue current POC with emphasis on improving postural awareness.     Monroe Taylor, PT

## 2023-05-24 ENCOUNTER — CLINICAL SUPPORT (OUTPATIENT)
Dept: REHABILITATION | Facility: HOSPITAL | Age: 28
End: 2023-05-24
Payer: COMMERCIAL

## 2023-05-24 DIAGNOSIS — R29.898 WEAKNESS OF BOTH UPPER EXTREMITIES: ICD-10-CM

## 2023-05-24 DIAGNOSIS — M54.2 CERVICALGIA: Primary | ICD-10-CM

## 2023-05-24 DIAGNOSIS — R29.898 DECREASED RANGE OF MOTION OF NECK: ICD-10-CM

## 2023-05-24 DIAGNOSIS — R68.89 DECREASED ACTIVITY TOLERANCE: ICD-10-CM

## 2023-05-24 PROCEDURE — 97140 MANUAL THERAPY 1/> REGIONS: CPT | Mod: PO,CQ

## 2023-05-24 PROCEDURE — 97110 THERAPEUTIC EXERCISES: CPT | Mod: PO,CQ

## 2023-05-24 PROCEDURE — 97112 NEUROMUSCULAR REEDUCATION: CPT | Mod: PO,CQ

## 2023-05-24 NOTE — PROGRESS NOTES
[facility]   Physical Therapy Treatment Note     Name: Renea Cox  Clinic Number: [id number]    Therapy Diagnosis:   Encounter Diagnoses   Name Primary?    Cervicalgia Yes    Decreased range of motion of neck     Weakness of both upper extremities     Decreased activity tolerance      Physician: Amy Spain MD    Visit Date: 5/24/2023  Physician Orders: PT Eval and Treat   Medical Diagnosis from Referral: M54.12 (ICD-10-CM) - Cervical radiculopathy R20.2 (ICD-10-CM) - Paresthesia of left upper extremity R20.2 (ICD-10-CM) - Paresthesia of right upper extremity   Evaluation Date: 4/20/2023  Authorization Period Expiration: 12/31/2023  Plan of Care Expiration: 6/30/2023  Visit # / Visits authorized: 9 / 20    PTA Visit #: 1/5     Re-Assessment due: 6/19/2023    Precautions: Standard     Time In: 1055 AM  Time Out: 1143 AM  Total Billable Time: 40 minutes     SUBJECTIVE     Pt reports: she feels like she slept wrong because her (L) upper trap is sore today. Patient states that she has been feeling better the last few days.   Response to previous treatment: no adverse effects   Functional change: too soon to determine     Pain: 3/10  Location: left neck      OBJECTIVE     Objective Measures updated at progress report unless specified.     Treatment     Renea received the treatments listed below:      therapeutic exercises to develop strength, endurance, ROM, flexibility, posture, and core stabilization for 15 minutes including:  UBE x 4 minutes (alternating forward andd retro)  Seated upper trap stretch x 30 sec x 2   Corner pec stretch, 3x 30s   Seated thoracic ext, 1x10    manual therapy techniques: Soft tissue Mobilization were applied to the: cervical musculature for 10 minutes, including:  STM to B upper traps   Cervical distraction  Suboccipital release  L scapula STM     neuromuscular re-education activities to improve: Coordination, Sense, Proprioception, and  Posture for 15 minutes. The following activities were included:  Supine chin tucks 2x10, 3 sec hold   Supine chin tuck + lift off x 10, 3-5 sec hold   Seated chin tucks 2x10, 3 sec hold   Seated scapular retractions 2x10, 3 sec hold   Seated no money (green TB) 2x10  Standing rows (blue TB) 3x10  Standing extensions (Green TB) 3x10    Patient Education and Home Exercises     Home Exercises Provided and Patient Education Provided     Education provided:   - HEP compliance    Written Home Exercises Provided: Patient instructed to cont prior HEP. Exercises were reviewed and Renea was able to demonstrate them prior to the end of the session.  Renea demonstrated good  understanding of the education provided. See EMR under Patient Instructions for exercises provided during therapy sessions    ASSESSMENT     Renea demonstrating increased tissue restriction through (L) upper trap today. Improvements in tissue pliability and sensitivity achieved with manual therapy techniques. Patient able to progress to chin tucks + head lift today without pain but decreased strength and endurance of deep neck flexors noted.     Renea Is progressing well towards her goals.   Pt prognosis is Good.     Pt will continue to benefit from skilled outpatient physical therapy to address the deficits listed in the problem list box on initial evaluation, provide pt/family education and to maximize pt's level of independence in the home and community environment.     Pt's spiritual, cultural and educational needs considered and pt agreeable to plan of care and goals.     Anticipated barriers to physical therapy: none    Goals:   Short Term Goals (4 Weeks):   - Pt will demonstrate compliance with initial home exercise program as prescribed by physical therapist to improve independence with management of condition. - MET  - Pt to improve active range of motion in cervical spine to 0% limitations to allow for improved functional mobility. -  MET  - Pt to increase strength to at least 4+/5 of muscles tested to allow for improvement in functional activities. - MET  - Pt to report cervical pain of <5/10 at worst to improve tolerance to ADLs and work. - MET    Long Term Goals (8 Weeks):   - Pt to achieve <36% limitation as measured by the FOTO to demonstrate decreased disability. - MET  - Pt to increase strength to at least 5/5 of muscles tested to allow for improvement in functional activities. - MET  - Pt to report cervical pain of <3/10 at worst to improve tolerance to ADLs and work. - NOT MET    PLAN     Continue current POC with emphasis on improving postural awareness.     Simran Ny, PTA

## 2023-05-26 ENCOUNTER — CLINICAL SUPPORT (OUTPATIENT)
Dept: REHABILITATION | Facility: HOSPITAL | Age: 28
End: 2023-05-26
Payer: COMMERCIAL

## 2023-05-26 DIAGNOSIS — R29.898 DECREASED RANGE OF MOTION OF NECK: ICD-10-CM

## 2023-05-26 DIAGNOSIS — R29.898 WEAKNESS OF BOTH UPPER EXTREMITIES: ICD-10-CM

## 2023-05-26 DIAGNOSIS — M54.2 CERVICALGIA: Primary | ICD-10-CM

## 2023-05-26 DIAGNOSIS — R68.89 DECREASED ACTIVITY TOLERANCE: ICD-10-CM

## 2023-05-26 PROCEDURE — 97110 THERAPEUTIC EXERCISES: CPT | Mod: PO,CQ

## 2023-05-26 PROCEDURE — 97112 NEUROMUSCULAR REEDUCATION: CPT | Mod: PO,CQ

## 2023-05-26 NOTE — PROGRESS NOTES
[facility]   Physical Therapy Treatment Note     Name: Renea Cox  Clinic Number: [id number]    Therapy Diagnosis:   Encounter Diagnoses   Name Primary?    Cervicalgia Yes    Decreased range of motion of neck     Weakness of both upper extremities     Decreased activity tolerance      Physician: Amy Spain MD    Visit Date: 5/26/2023  Physician Orders: PT Eval and Treat   Medical Diagnosis from Referral: M54.12 (ICD-10-CM) - Cervical radiculopathy R20.2 (ICD-10-CM) - Paresthesia of left upper extremity R20.2 (ICD-10-CM) - Paresthesia of right upper extremity   Evaluation Date: 4/20/2023  Authorization Period Expiration: 12/31/2023  Plan of Care Expiration: 6/30/2023  Visit # / Visits authorized: 10 / 20    PTA Visit #: 2/5     Re-Assessment due: 6/19/2023    Precautions: Standard     Time In: 1101 AM  Time Out: 1145 AM  Total Billable Time: 30 minutes     SUBJECTIVE     Pt reports: her neck isn't feeling as sore today. Patient reports compliance with HEP.   Response to previous treatment: no adverse effects   Functional change: too soon to determine     Pain: 1-2/10  Location: left neck      OBJECTIVE     Objective Measures updated at progress report unless specified.     Treatment     Renea received the treatments listed below:      therapeutic exercises to develop strength, endurance, ROM, flexibility, posture, and core stabilization for 15 minutes including:  UBE x 4 minutes (alternating forward andd retro)  Supine horizontal abduction 2x10, green TB  Supine chin tucks 2x10, 3 sec hold   Seated upper trap stretch x 30 sec x 2   Corner pec stretch, 3x 30s   Seated thoracic ext, 1x10  Standing rows (blue TB) 3x10    manual therapy techniques: Soft tissue Mobilization were applied to the: cervical musculature for 10 minutes, including:  STM to B upper traps   Cervical distraction  Suboccipital release  L scapula STM     neuromuscular re-education activities to  improve: Coordination, Sense, Proprioception, and Posture for 15 minutes. The following activities were included:  Supine chin tuck + lift off x 10, 3-5 sec hold   Seated chin tucks 2x10, 3 sec hold   Seated scapular retractions 2x10, 3 sec hold   Seated scap retraction + no money (green TB) 2x10  Standing extensions (Green TB) 3x10  Wall slide x 10, 5 sec hold     Patient Education and Home Exercises     Home Exercises Provided and Patient Education Provided     Education provided:   - HEP compliance    Written Home Exercises Provided: Patient instructed to cont prior HEP. Exercises were reviewed and Renea was able to demonstrate them prior to the end of the session.  Renea demonstrated good  understanding of the education provided. See EMR under Patient Instructions for exercises provided during therapy sessions    ASSESSMENT     Renea demonstrating improvements in (L) upper trap tissue mobility today. Good tolerance to progression to wall slides without pain but cues needed for correct serratus activation. Patient demonstrating improvements in postural awareness with only minimal cues for proper performance of chin tucks when seated.     Renea Is progressing well towards her goals.   Pt prognosis is Good.     Pt will continue to benefit from skilled outpatient physical therapy to address the deficits listed in the problem list box on initial evaluation, provide pt/family education and to maximize pt's level of independence in the home and community environment.     Pt's spiritual, cultural and educational needs considered and pt agreeable to plan of care and goals.     Anticipated barriers to physical therapy: none    Goals:   Short Term Goals (4 Weeks):   - Pt will demonstrate compliance with initial home exercise program as prescribed by physical therapist to improve independence with management of condition. - MET  - Pt to improve active range of motion in cervical spine to 0% limitations to allow for  improved functional mobility. - MET  - Pt to increase strength to at least 4+/5 of muscles tested to allow for improvement in functional activities. - MET  - Pt to report cervical pain of <5/10 at worst to improve tolerance to ADLs and work. - MET    Long Term Goals (8 Weeks):   - Pt to achieve <36% limitation as measured by the FOTO to demonstrate decreased disability. - MET  - Pt to increase strength to at least 5/5 of muscles tested to allow for improvement in functional activities. - MET  - Pt to report cervical pain of <3/10 at worst to improve tolerance to ADLs and work. - NOT MET    PLAN     Continue current POC with emphasis on improving postural awareness.     Simran Ny, PTA

## 2023-05-31 ENCOUNTER — CLINICAL SUPPORT (OUTPATIENT)
Dept: REHABILITATION | Facility: HOSPITAL | Age: 28
End: 2023-05-31
Payer: COMMERCIAL

## 2023-05-31 DIAGNOSIS — R29.898 DECREASED RANGE OF MOTION OF NECK: ICD-10-CM

## 2023-05-31 DIAGNOSIS — M54.2 CERVICALGIA: Primary | ICD-10-CM

## 2023-05-31 DIAGNOSIS — R68.89 DECREASED ACTIVITY TOLERANCE: ICD-10-CM

## 2023-05-31 DIAGNOSIS — R29.898 WEAKNESS OF BOTH UPPER EXTREMITIES: ICD-10-CM

## 2023-05-31 PROCEDURE — 97140 MANUAL THERAPY 1/> REGIONS: CPT | Mod: PO,CQ

## 2023-05-31 PROCEDURE — 97112 NEUROMUSCULAR REEDUCATION: CPT | Mod: PO,CQ

## 2023-05-31 PROCEDURE — 97110 THERAPEUTIC EXERCISES: CPT | Mod: PO,CQ

## 2023-05-31 NOTE — PROGRESS NOTES
[facility]   Physical Therapy Treatment Note     Name: Renea Cox  Clinic Number: [id number]    Therapy Diagnosis:   Encounter Diagnoses   Name Primary?    Cervicalgia Yes    Decreased range of motion of neck     Weakness of both upper extremities     Decreased activity tolerance      Physician: Amy Spain MD    Visit Date: 5/31/2023  Physician Orders: PT Eval and Treat   Medical Diagnosis from Referral: M54.12 (ICD-10-CM) - Cervical radiculopathy R20.2 (ICD-10-CM) - Paresthesia of left upper extremity R20.2 (ICD-10-CM) - Paresthesia of right upper extremity   Evaluation Date: 4/20/2023  Authorization Period Expiration: 12/31/2023  Plan of Care Expiration: 6/30/2023  Visit # / Visits authorized: 12 / 20    PTA Visit #: 3/5     Re-Assessment due: 6/19/2023    Precautions: Standard     Time In: 1100 AM  Time Out: 1150 AM  Total Billable Time: 50 minutes     SUBJECTIVE     Pt reports: her neck isn't feeling as sore today, it is just uncomfortable. Patient reports compliance with HEP.   Response to previous treatment: no adverse effects   Functional change: too soon to determine     Pain: 1/10  Location: left neck      OBJECTIVE     Objective Measures updated at progress report unless specified.     Treatment     Renea received the treatments listed below:      therapeutic exercises to develop strength, endurance, ROM, flexibility, posture, and core stabilization for 20 minutes including:  UBE x 4 minutes (alternating forward andd retro)  Supine horizontal abduction 3x10, green TB  Supine chin tucks 2x10, 3 sec hold   Seated upper trap stretch x 30 sec x 2   Corner pec stretch, 3x 30s   Seated thoracic ext, 1x10  Seated scapular retractions 2x10, 3 sec hold   Standing rows (blue TB) 3x10    manual therapy techniques: Soft tissue Mobilization were applied to the: cervical musculature for 10 minutes, including:  STM to B upper traps   Cervical distraction  Suboccipital  release  L scapula STM     neuromuscular re-education activities to improve: Coordination, Sense, Proprioception, and Posture for 20 minutes. The following activities were included:  Supine chin tuck + lift off x 10, 3-5 sec hold   Seated chin tucks 2x10, 3 sec hold   Seated scap retraction + no money (green TB) 3x10  Standing extensions (Green TB) 3x10  Wall slide x 10, 5 sec hold     Patient Education and Home Exercises     Home Exercises Provided and Patient Education Provided     Education provided:   - HEP compliance    Written Home Exercises Provided: Patient instructed to cont prior HEP. Exercises were reviewed and Renea was able to demonstrate them prior to the end of the session.  Renea demonstrated good  understanding of the education provided. See EMR under Patient Instructions for exercises provided during therapy sessions    ASSESSMENT     Renea presented with mild tightness and soreness in neck, but noted positive results following manual therapy techniques. Pt demonstrated good posture and neuromuscular control during chin tucks with holds. Pt continues to be challenged with serratus activation during wall slides, but improved with reps and tactile cues. Pt tolerated all progressions well with no adverse effects.    Renea Is progressing well towards her goals.   Pt prognosis is Good.     Pt will continue to benefit from skilled outpatient physical therapy to address the deficits listed in the problem list box on initial evaluation, provide pt/family education and to maximize pt's level of independence in the home and community environment.     Pt's spiritual, cultural and educational needs considered and pt agreeable to plan of care and goals.     Anticipated barriers to physical therapy: none    Goals:   Short Term Goals (4 Weeks):   - Pt will demonstrate compliance with initial home exercise program as prescribed by physical therapist to improve independence with management of condition. -  MET  - Pt to improve active range of motion in cervical spine to 0% limitations to allow for improved functional mobility. - MET  - Pt to increase strength to at least 4+/5 of muscles tested to allow for improvement in functional activities. - MET  - Pt to report cervical pain of <5/10 at worst to improve tolerance to ADLs and work. - MET    Long Term Goals (8 Weeks):   - Pt to achieve <36% limitation as measured by the FOTO to demonstrate decreased disability. - MET  - Pt to increase strength to at least 5/5 of muscles tested to allow for improvement in functional activities. - MET  - Pt to report cervical pain of <3/10 at worst to improve tolerance to ADLs and work. - NOT MET    PLAN     Continue current POC with emphasis on improving postural awareness.     Simran Ny, PTA

## 2023-06-02 ENCOUNTER — CLINICAL SUPPORT (OUTPATIENT)
Dept: REHABILITATION | Facility: HOSPITAL | Age: 28
End: 2023-06-02
Payer: COMMERCIAL

## 2023-06-02 DIAGNOSIS — R68.89 DECREASED ACTIVITY TOLERANCE: ICD-10-CM

## 2023-06-02 DIAGNOSIS — R29.898 WEAKNESS OF BOTH UPPER EXTREMITIES: ICD-10-CM

## 2023-06-02 DIAGNOSIS — R29.898 DECREASED RANGE OF MOTION OF NECK: ICD-10-CM

## 2023-06-02 DIAGNOSIS — M54.2 CERVICALGIA: Primary | ICD-10-CM

## 2023-06-02 PROCEDURE — 97112 NEUROMUSCULAR REEDUCATION: CPT | Mod: PO,CQ

## 2023-06-02 PROCEDURE — 97110 THERAPEUTIC EXERCISES: CPT | Mod: PO,CQ

## 2023-06-02 PROCEDURE — 97140 MANUAL THERAPY 1/> REGIONS: CPT | Mod: PO,CQ

## 2023-06-02 NOTE — PROGRESS NOTES
[facility]   Physical Therapy Treatment Note     Name: Renea Cox  Clinic Number: [id number]    Therapy Diagnosis:   Encounter Diagnoses   Name Primary?    Cervicalgia Yes    Decreased range of motion of neck     Weakness of both upper extremities     Decreased activity tolerance      Physician: Amy Spain MD    Visit Date: 6/2/2023  Physician Orders: PT Eval and Treat   Medical Diagnosis from Referral: M54.12 (ICD-10-CM) - Cervical radiculopathy R20.2 (ICD-10-CM) - Paresthesia of left upper extremity R20.2 (ICD-10-CM) - Paresthesia of right upper extremity   Evaluation Date: 4/20/2023  Authorization Period Expiration: 12/31/2023  Plan of Care Expiration: 6/30/2023  Visit # / Visits authorized: 12/ 20    PTA Visit #: 4/5     Re-Assessment due: 6/19/2023    Precautions: Standard     Time In: 1100 AM  Time Out: 1154 AM  Total Billable Time: 53 minutes     SUBJECTIVE     Pt reports: her neck is feeling okay with a little (R) UT soreness.  Patient reports compliance with HEP.   Response to previous treatment: no adverse effects   Functional change: too soon to determine     Pain: 1/10  Location: left neck      OBJECTIVE     Objective Measures updated at progress report unless specified.     Treatment     Renea received the treatments listed below:      therapeutic exercises to develop strength, endurance, ROM, flexibility, posture, and core stabilization for 25 minutes including:  UBE x 4 minutes (alternating forward andd retro)  Supine horizontal abduction 3x10, blue TB  Supine chin tucks 2x10, 3 sec hold   Seated upper trap stretch x 30 sec x 2   Corner pec stretch, 3x 30s   Seated thoracic ext, 2x10  Seated scapular retractions 2x10, 3 sec hold   Standing rows (blue TB) 3x10    manual therapy techniques: Soft tissue Mobilization were applied to the: cervical musculature for 10 minutes, including:  STM to B upper traps   Cervical distraction  Suboccipital release  L  scapula STM     neuromuscular re-education activities to improve: Coordination, Sense, Proprioception, and Posture for 20 minutes. The following activities were included:  Supine chin tuck + lift off x 10, 3-5 sec hold   Seated chin tucks 2x10, 3 sec hold   Seated scap retraction + no money (green TB) 3x10  Standing extensions (Green TB) 3x10, 3 sec holds  Wall slide  with lift x 10, 5 sec hold   Wall clocks x 5 ea, yellow TB    Patient Education and Home Exercises     Home Exercises Provided and Patient Education Provided     Education provided:   - HEP compliance    Written Home Exercises Provided: Patient instructed to cont prior HEP. Exercises were reviewed and Renea was able to demonstrate them prior to the end of the session.  Renea demonstrated good  understanding of the education provided. See EMR under Patient Instructions for exercises provided during therapy sessions    ASSESSMENT     Renea demonstrates good neuromuscular control being able to maintain chin tuck and lift for 4 seconds. Pt presented with (R) UT tightness with a palpable nodule but noted positive results following STM. Wall clocks introduced to increase UE strength and stability; tolerated well with no adverse effects.    Renea Is progressing well towards her goals.   Pt prognosis is Good.     Pt will continue to benefit from skilled outpatient physical therapy to address the deficits listed in the problem list box on initial evaluation, provide pt/family education and to maximize pt's level of independence in the home and community environment.     Pt's spiritual, cultural and educational needs considered and pt agreeable to plan of care and goals.     Anticipated barriers to physical therapy: none    Goals:   Short Term Goals (4 Weeks):   - Pt will demonstrate compliance with initial home exercise program as prescribed by physical therapist to improve independence with management of condition. - MET  - Pt to improve active range  of motion in cervical spine to 0% limitations to allow for improved functional mobility. - MET  - Pt to increase strength to at least 4+/5 of muscles tested to allow for improvement in functional activities. - MET  - Pt to report cervical pain of <5/10 at worst to improve tolerance to ADLs and work. - MET    Long Term Goals (8 Weeks):   - Pt to achieve <36% limitation as measured by the FOTO to demonstrate decreased disability. - MET  - Pt to increase strength to at least 5/5 of muscles tested to allow for improvement in functional activities. - MET  - Pt to report cervical pain of <3/10 at worst to improve tolerance to ADLs and work. - NOT MET    PLAN     Continue current POC with emphasis on improving postural awareness.     Simran Ny, PTA

## 2023-06-07 ENCOUNTER — CLINICAL SUPPORT (OUTPATIENT)
Dept: REHABILITATION | Facility: HOSPITAL | Age: 28
End: 2023-06-07
Payer: COMMERCIAL

## 2023-06-07 DIAGNOSIS — R68.89 DECREASED ACTIVITY TOLERANCE: ICD-10-CM

## 2023-06-07 DIAGNOSIS — M54.2 CERVICALGIA: Primary | ICD-10-CM

## 2023-06-07 DIAGNOSIS — R29.898 WEAKNESS OF BOTH UPPER EXTREMITIES: ICD-10-CM

## 2023-06-07 DIAGNOSIS — R29.898 DECREASED RANGE OF MOTION OF NECK: ICD-10-CM

## 2023-06-07 PROCEDURE — 97110 THERAPEUTIC EXERCISES: CPT | Mod: PO | Performed by: PHYSICAL THERAPIST

## 2023-06-07 PROCEDURE — 97112 NEUROMUSCULAR REEDUCATION: CPT | Mod: PO | Performed by: PHYSICAL THERAPIST

## 2023-06-07 PROCEDURE — 97140 MANUAL THERAPY 1/> REGIONS: CPT | Mod: PO | Performed by: PHYSICAL THERAPIST

## 2023-06-07 NOTE — PROGRESS NOTES
[facility]   Physical Therapy Treatment Note     Name: Renea Cox  Clinic Number: [id number]    Therapy Diagnosis:   Encounter Diagnoses   Name Primary?    Cervicalgia Yes    Decreased range of motion of neck     Weakness of both upper extremities     Decreased activity tolerance      Physician: Amy Spain MD    Visit Date: 6/7/2023  Physician Orders: PT Eval and Treat   Medical Diagnosis from Referral: M54.12 (ICD-10-CM) - Cervical radiculopathy R20.2 (ICD-10-CM) - Paresthesia of left upper extremity R20.2 (ICD-10-CM) - Paresthesia of right upper extremity   Evaluation Date: 4/20/2023  Authorization Period Expiration: 12/31/2023  Plan of Care Expiration: 6/30/2023  Visit # / Visits authorized: 13/ 20    PTA Visit #: 0/5     Re-Assessment due: 6/19/2023    Precautions: Standard     Time In: 100 PM  Time Out: 200 PM  Total Billable Time: 54 minutes     SUBJECTIVE     Pt reports: a little stiffness of Left shoulder/neck area.   Response to previous treatment: no adverse effects   Functional change: ROM and strength WNL, improving QOL.    Pain: 0/10  Location: left neck      OBJECTIVE     Objective Measures updated at progress report unless specified.     Treatment     Renea received the treatments listed below:      therapeutic exercises to develop strength, endurance, ROM, flexibility, posture, and core stabilization for 25 minutes including:  UBE x 4 minutes (alternating forward and retro)-np  Supine horizontal abduction 3x10, blue TB  Supine chin tucks 2x10, 3 sec hold   Seated upper trap stretch x 30 sec x 2  Updated HEP and bands given.   Assessment as new PT and developing plan.    NP:  Corner pec stretch, 3x 30s   Seated thoracic ext, 2x10  Seated scapular retractions 2x10, 3 sec hold   Standing rows (blue TB) 3x10    manual therapy techniques: Soft tissue Mobilization were applied to the: cervical musculature for 15 minutes, including:  Discussed the purpose,  mechanism, and indications for dry needling with Renea. Patient was cleared of all precautions and contraindications and pt signed written consent and gave verbal consent to dry needling Rx today.   Palpation used to determine dry needling sites. Pt rec'd dry needling in prone position to L upper trap with 0.30x0.50 mm needles w/ e-stim, and to Levator Scapulae scapulae and rhomboid minor without e stim. Twitches noted in minor and UPPER TRAP.  Pt tolerated treatment well and was not in any distress at the completion of treatment.    NP:  STM to B upper traps   Cervical distraction  Suboccipital release  L scapula STM     neuromuscular re-education activities to improve: Coordination, Sense, Proprioception, and Posture for 20 minutes. The following activities were included:  Supine chin tuck + lift off x 10, 7 sec hold   Seated chin tucks 2x10, 3 sec hold     Np:  Seated scap retraction + no money (green TB) 3x10  Standing extensions (Green TB) 3x10, 3 sec holds  Wall slide  with lift x 10, 5 sec hold   Wall clocks x 5 ea, yellow TB    Patient Education and Home Exercises     Home Exercises Provided and Patient Education Provided     Education provided:   - HEP compliance    Written Home Exercises Provided: yes. Exercises were reviewed and Renea was able to demonstrate them prior to the end of the session.  Renea demonstrated good  understanding of the education provided. See EMR under Patient Instructions for exercises provided during therapy sessions. 6/7/2023.    ASSESSMENT     Renea demonstrates good neuromuscular control being able to maintain chin tuck and lift for 7 seconds and improving. Pt presented with (R) UT tightness with a palpable nodule but noted positive results following STM. Significant twitch response in Left UPPER TRAP, less in rhomboid minor, and none in Levator Scapulae Scapular. Estim to fatigue Left UPPER TRAP performed. We progressed home program and discussed continuing a few  visits with d/c to HEP if doing well with home/work management.    Renea Is progressing well towards her goals.   Pt prognosis is Good.     Pt will continue to benefit from skilled outpatient physical therapy to address the deficits listed in the problem list box on initial evaluation, provide pt/family education and to maximize pt's level of independence in the home and community environment.     Pt's spiritual, cultural and educational needs considered and pt agreeable to plan of care and goals.     Anticipated barriers to physical therapy: none    Goals:   Short Term Goals (4 Weeks):   - Pt will demonstrate compliance with initial home exercise program as prescribed by physical therapist to improve independence with management of condition. - MET  - Pt to improve active range of motion in cervical spine to 0% limitations to allow for improved functional mobility. - MET  - Pt to increase strength to at least 4+/5 of muscles tested to allow for improvement in functional activities. - MET  - Pt to report cervical pain of <5/10 at worst to improve tolerance to ADLs and work. - MET    Long Term Goals (8 Weeks):   - Pt to achieve <36% limitation as measured by the FOTO to demonstrate decreased disability. - MET  - Pt to increase strength to at least 5/5 of muscles tested to allow for improvement in functional activities. - MET  - Pt to report cervical pain of <3/10 at worst to improve tolerance to ADLs and work. - NOT MET    PLAN     Continue current POC with emphasis on improving postural awareness.     Jessica Soto, PT

## 2023-06-09 ENCOUNTER — CLINICAL SUPPORT (OUTPATIENT)
Dept: REHABILITATION | Facility: HOSPITAL | Age: 28
End: 2023-06-09
Payer: COMMERCIAL

## 2023-06-09 DIAGNOSIS — R29.898 WEAKNESS OF BOTH UPPER EXTREMITIES: ICD-10-CM

## 2023-06-09 DIAGNOSIS — R29.898 DECREASED RANGE OF MOTION OF NECK: ICD-10-CM

## 2023-06-09 DIAGNOSIS — R68.89 DECREASED ACTIVITY TOLERANCE: ICD-10-CM

## 2023-06-09 DIAGNOSIS — M54.2 CERVICALGIA: Primary | ICD-10-CM

## 2023-06-09 PROCEDURE — 97140 MANUAL THERAPY 1/> REGIONS: CPT | Mod: PO,CQ

## 2023-06-09 PROCEDURE — 97110 THERAPEUTIC EXERCISES: CPT | Mod: PO,CQ

## 2023-06-09 PROCEDURE — 97112 NEUROMUSCULAR REEDUCATION: CPT | Mod: PO,CQ

## 2023-06-09 NOTE — PROGRESS NOTES
[facility]   Physical Therapy Treatment Note     Name: Renea Cox  Clinic Number: [id number]    Therapy Diagnosis:   Encounter Diagnoses   Name Primary?    Cervicalgia Yes    Decreased range of motion of neck     Weakness of both upper extremities     Decreased activity tolerance      Physician: Amy Spain MD    Visit Date: 6/9/2023  Physician Orders: PT Eval and Treat   Medical Diagnosis from Referral: M54.12 (ICD-10-CM) - Cervical radiculopathy R20.2 (ICD-10-CM) - Paresthesia of left upper extremity R20.2 (ICD-10-CM) - Paresthesia of right upper extremity   Evaluation Date: 4/20/2023  Authorization Period Expiration: 12/31/2023  Plan of Care Expiration: 6/30/2023  Visit # / Visits authorized: 14/ 20    PTA Visit #: 1/5     Re-Assessment due: 6/19/2023    Precautions: Standard     Time In: 1100 AM  Time Out: 1145 AM  Total Billable Time: 45 minutes     SUBJECTIVE     Pt reports: she was needled last treatment session and she is feeling good today. Patient states she is feeling good today and her neck isn't as sore.   Response to previous treatment: no adverse effects   Functional change: ROM and strength WNL, improving QOL.    Pain: 0/10  Location: left neck      OBJECTIVE     Objective Measures updated at progress report unless specified.     Treatment     Renea received the treatments listed below:      therapeutic exercises to develop strength, endurance, ROM, flexibility, posture, and core stabilization for 25 minutes including:  UBE x 6 minutes (alternating forward and retro)  Supine horizontal abduction 3x10, blue TB  Supine chin tucks 2x10, 3 sec hold   Seated upper trap stretch x 30 sec x 2    NP:  Corner pec stretch, 3x 30s   Seated thoracic ext, 2x10  Seated scapular retractions 2x10, 3 sec hold   Standing rows (blue TB) 3x10    manual therapy techniques: Soft tissue Mobilization were applied to the: cervical musculature for 15 minutes, including:  STM to  B upper traps   Cervical distraction  Suboccipital release  L scapula STM     neuromuscular re-education activities to improve: Coordination, Sense, Proprioception, and Posture for 20 minutes. The following activities were included:  Supine chin tuck + lift off x 10, 7 sec hold   Seated chin tucks 2x10, 3 sec hold   Seated scap retraction + no money (green TB) 3x10  Standing extensions (Green TB) 3x10, 3 sec holds  Wall slide with lift x 10, 5 sec hold   Wall clocks x 5 ea, yellow TB    Patient Education and Home Exercises     Home Exercises Provided and Patient Education Provided     Education provided:   - HEP compliance    Written Home Exercises Provided: Patient instructed to cont prior HEP. Exercises were reviewed and Renea was able to demonstrate them prior to the end of the session.  Renea demonstrated good  understanding of the education provided. See EMR under Patient Instructions for exercises provided during therapy sessions. 6/7/2023.    ASSESSMENT     Renea demonstrating much improved upper trap pliability following previous session's dry needling. Intermittent cueing for upper trap activation but postural awareness is significantly improved when compared to initial evaluation. Tendency to allow for cervical flexion with seated chin tucks but this improves with tactile feedback. Patient reporting confidence with HEP and she is prepared for discharge in the next few visits.     Renea Is progressing well towards her goals.   Pt prognosis is Good.     Pt will continue to benefit from skilled outpatient physical therapy to address the deficits listed in the problem list box on initial evaluation, provide pt/family education and to maximize pt's level of independence in the home and community environment.     Pt's spiritual, cultural and educational needs considered and pt agreeable to plan of care and goals.     Anticipated barriers to physical therapy: none    Goals:   Short Term Goals (4 Weeks):    - Pt will demonstrate compliance with initial home exercise program as prescribed by physical therapist to improve independence with management of condition. - MET  - Pt to improve active range of motion in cervical spine to 0% limitations to allow for improved functional mobility. - MET  - Pt to increase strength to at least 4+/5 of muscles tested to allow for improvement in functional activities. - MET  - Pt to report cervical pain of <5/10 at worst to improve tolerance to ADLs and work. - MET    Long Term Goals (8 Weeks):   - Pt to achieve <36% limitation as measured by the FOTO to demonstrate decreased disability. - MET  - Pt to increase strength to at least 5/5 of muscles tested to allow for improvement in functional activities. - MET  - Pt to report cervical pain of <3/10 at worst to improve tolerance to ADLs and work. - NOT MET    PLAN     Continue current POC with emphasis on improving postural awareness.     Simran Ny, PTA

## 2023-06-15 ENCOUNTER — CLINICAL SUPPORT (OUTPATIENT)
Dept: REHABILITATION | Facility: HOSPITAL | Age: 28
End: 2023-06-15
Payer: COMMERCIAL

## 2023-06-15 DIAGNOSIS — M54.2 CERVICALGIA: Primary | ICD-10-CM

## 2023-06-15 DIAGNOSIS — R29.898 WEAKNESS OF BOTH UPPER EXTREMITIES: ICD-10-CM

## 2023-06-15 DIAGNOSIS — R68.89 DECREASED ACTIVITY TOLERANCE: ICD-10-CM

## 2023-06-15 DIAGNOSIS — R29.898 DECREASED RANGE OF MOTION OF NECK: ICD-10-CM

## 2023-06-15 PROCEDURE — 97110 THERAPEUTIC EXERCISES: CPT | Mod: PO | Performed by: PHYSICAL THERAPIST

## 2023-06-15 PROCEDURE — 97140 MANUAL THERAPY 1/> REGIONS: CPT | Mod: PO | Performed by: PHYSICAL THERAPIST

## 2023-06-15 PROCEDURE — 97112 NEUROMUSCULAR REEDUCATION: CPT | Mod: PO | Performed by: PHYSICAL THERAPIST

## 2023-06-15 NOTE — PROGRESS NOTES
[facility]   Physical Therapy Treatment Note     Name: Renea Cox  Clinic Number: [id number]    Therapy Diagnosis:   Encounter Diagnoses   Name Primary?    Cervicalgia Yes    Decreased range of motion of neck     Weakness of both upper extremities     Decreased activity tolerance      Physician: Amy Spain MD    Visit Date: 6/15/2023  Physician Orders: PT Eval and Treat   Medical Diagnosis from Referral: M54.12 (ICD-10-CM) - Cervical radiculopathy R20.2 (ICD-10-CM) - Paresthesia of left upper extremity R20.2 (ICD-10-CM) - Paresthesia of right upper extremity   Evaluation Date: 4/20/2023  Authorization Period Expiration: 12/31/2023  Plan of Care Expiration: 6/30/2023  Visit # / Visits authorized: 15/ 20    PTA Visit #: 0/5     Re-Assessment due: 6/19/2023    Precautions: Standard     Time In: 1300   Time Out: 1400  Total Billable Time: 40 minutes     SUBJECTIVE     Pt reports: she was needled last treatment session and she is feeling good today. Patient states she is feeling good today and her neck isn't as sore.   Response to previous treatment: no adverse effects   Functional change: ROM and strength WNL, improving QOL.    Pain: 0/10  Location: left neck      OBJECTIVE     Objective Measures updated at progress report unless specified.     Treatment     Renea received the treatments listed below:      therapeutic exercises to develop strength, endurance, ROM, flexibility, posture, and core stabilization for 15 minutes including:  Supine horizontal abduction 3x10, blue TB  Supine chin tucks 2x10, 3 sec hold   Seated upper trap stretch x 30 sec x 2    NP:  Corner pec stretch, 3x 30s   Seated thoracic ext, 2x10  Seated scapular retractions 2x10, 3 sec hold   Standing rows (blue TB) 3x10    manual therapy techniques: Soft tissue Mobilization were applied to the: cervical musculature for 15 minutes, including:  STM to B upper traps   Cervical distraction  Suboccipital  release    neuromuscular re-education activities to improve: Coordination, Sense, Proprioception, and Posture for 20 minutes. The following activities were included:  Supine chin tuck + lift off x 10, 7 sec hold   Seated chin tucks 2x10, 3 sec hold   Seated scap retraction + no money (green TB) 3x10  Standing extensions (Green TB) 3x10, 3 sec holds  Wall slide with lift x 10, 5 sec hold   Wall clocks x 5 ea, yellow TB    Patient Education and Home Exercises     Home Exercises Provided and Patient Education Provided     Education provided:   - HEP compliance    Written Home Exercises Provided: Patient instructed to cont prior HEP. Exercises were reviewed and Renea was able to demonstrate them prior to the end of the session.  Renea demonstrated good  understanding of the education provided. See EMR under Patient Instructions for exercises provided during therapy sessions. 6/7/2023.    ASSESSMENT     Renea has high demand for postural control as she travels in car distances at times and works long to late hours. She has received 15 sessions and will be discharged next session. She reports condition is much better and she will cont home program.    Renea Is progressing well towards her goals.   Pt prognosis is Good.     Pt will continue to benefit from skilled outpatient physical therapy to address the deficits listed in the problem list box on initial evaluation, provide pt/family education and to maximize pt's level of independence in the home and community environment.     Pt's spiritual, cultural and educational needs considered and pt agreeable to plan of care and goals.     Anticipated barriers to physical therapy: none    Goals:   Short Term Goals (4 Weeks):   - Pt will demonstrate compliance with initial home exercise program as prescribed by physical therapist to improve independence with management of condition. - MET  - Pt to improve active range of motion in cervical spine to 0% limitations to allow  for improved functional mobility. - MET  - Pt to increase strength to at least 4+/5 of muscles tested to allow for improvement in functional activities. - MET  - Pt to report cervical pain of <5/10 at worst to improve tolerance to ADLs and work. - MET    Long Term Goals (8 Weeks):   - Pt to achieve <36% limitation as measured by the FOTO to demonstrate decreased disability. - MET  - Pt to increase strength to at least 5/5 of muscles tested to allow for improvement in functional activities. - MET  - Pt to report cervical pain of <3/10 at worst to improve tolerance to ADLs and work. - NOT MET    PLAN     Continue current POC with emphasis on improving postural awareness.     Jessica Soto, PT

## 2023-06-16 ENCOUNTER — CLINICAL SUPPORT (OUTPATIENT)
Dept: REHABILITATION | Facility: HOSPITAL | Age: 28
End: 2023-06-16
Payer: COMMERCIAL

## 2023-06-16 DIAGNOSIS — R29.898 WEAKNESS OF BOTH UPPER EXTREMITIES: ICD-10-CM

## 2023-06-16 DIAGNOSIS — R29.898 DECREASED RANGE OF MOTION OF NECK: ICD-10-CM

## 2023-06-16 DIAGNOSIS — M54.2 CERVICALGIA: Primary | ICD-10-CM

## 2023-06-16 DIAGNOSIS — R68.89 DECREASED ACTIVITY TOLERANCE: ICD-10-CM

## 2023-06-16 NOTE — PLAN OF CARE
[facility]  PT Discharge Note     Name: Renea Cox  Clinic Number: [id number]     Therapy Diagnosis:        Encounter Diagnoses   Name Primary?    Cervicalgia Yes    Decreased range of motion of neck      Weakness of both upper extremities      Decreased activity tolerance        Physician: Amy Spain MD     Physician Orders: PT Eval and Treat   Medical Diagnosis from Referral: M54.12 (ICD-10-CM) - Cervical radiculopathy R20.2 (ICD-10-CM) - Paresthesia of left upper extremity R20.2 (ICD-10-CM) - Paresthesia of right upper extremity   Evaluation Date: 4/20/2023     Date of Last visit: 6/16/2023  Total Visits Received: 16     No show: 0  C/c: 1     ASSESSMENT       Renea dillon improved postural awareness while sitting to use tablet and phone in clinic. Her pain is well managed with low pn numbers when present. She demo good ex performance. Discharge to home program.     Discharge reason: Patient has met all of his/her goals.     Discharge FOTO Score: 34%     Goals:   Short Term Goals (4 Weeks):   - Pt will demonstrate compliance with initial home exercise program as prescribed by physical therapist to improve independence with management of condition. - MET  - Pt to improve active range of motion in cervical spine to 0% limitations to allow for improved functional mobility. - MET  - Pt to increase strength to at least 4+/5 of muscles tested to allow for improvement in functional activities. - MET  - Pt to report cervical pain of <5/10 at worst to improve tolerance to ADLs and work. - MET     Long Term Goals (8 Weeks):   - Pt to achieve <36% limitation as measured by the FOTO to demonstrate decreased disability. - MET  - Pt to increase strength to at least 5/5 of muscles tested to allow for improvement in functional activities. - MET  - Pt to report cervical pain of <3/10 at worst to improve tolerance to ADLs and work. - MET     PLAN   This patient is discharged from  Physical Therapy.        Jessica Soto, PT       [facility]   Physical Therapy Treatment Note      Name: Renea Cox  Clinic Number: [id number]     Therapy Diagnosis:        Encounter Diagnoses   Name Primary?    Cervicalgia Yes    Decreased range of motion of neck      Weakness of both upper extremities      Decreased activity tolerance        Physician: Amy Spain MD     Visit Date: 6/16/2023  Physician Orders: PT Eval and Treat   Medical Diagnosis from Referral: M54.12 (ICD-10-CM) - Cervical radiculopathy R20.2 (ICD-10-CM) - Paresthesia of left upper extremity R20.2 (ICD-10-CM) - Paresthesia of right upper extremity   Evaluation Date: 4/20/2023  Authorization Period Expiration: 12/31/2023  Plan of Care Expiration: 6/30/2023  Visit # / Visits authorized: 16/ 20     PTA Visit #: 0/5      Precautions: Standard      Time In: 800   Time Out: 900  Total Billable Time: 54 minutes      SUBJECTIVE      Pt reports: she is a bit sore from driving 6 hours after therapy yesterday and working last night.  Response to previous treatment: no adverse effects   Functional change: improved condition, posture, strength     Pain: 0/10  Location: left neck       OBJECTIVE      Objective Measures updated at progress report unless specified.   See goals.     Treatment      Renea received the treatments listed below:       therapeutic exercises to develop strength, endurance, ROM, flexibility, posture, and core stabilization for 15 minutes including:  Supine horizontal abduction 3x10, blue TB  Supine chin tucks 2x10, 3 sec hold   Seated upper trap stretch x 30 sec x 2  Seated thoracic ext, 2x10  Standing rows (blue TB) 3x10     manual therapy techniques: Soft tissue Mobilization were applied to the: cervical musculature for 08 minutes, including:  Upper t/s extension mobs  Cervical distraction  Suboccipital release     neuromuscular re-education activities to improve: Coordination, Sense,  Proprioception, and Posture for 20 minutes. The following activities were included:  Supine chin tuck + lift off x 10, 7 sec hold   Seated chin tucks 2x10, 3 sec hold   Seated scap retraction + no money (green TB) 3x10  Standing extensions (Green TB) 3x10, 3 sec holds  Wall slide with lift x 10, 5 sec hold   Wall clocks x 5 ea, yellow THERABAND  Prone Y, T, Scapular retractions x 10 each     Patient Education and Home Exercises      Home Exercises Provided and Patient Education Provided      Education provided:   - HEP and discharge plan     Written Home Exercises Provided: Patient instructed to cont prior HEP. Exercises were reviewed and Renea was able to demonstrate them prior to the end of the session.  Renea demonstrated good  understanding of the education provided. See EMR under Patient Instructions for exercises provided during therapy sessions. 6/7/2023.     ASSESSMENT      Renea has high demand for postural control as she travels in car distances at times and works long to late hours. She has received 16 sessions and is discharged to [facility] with improved posture awareness. She reports condition is much better and she will cont home program.     Goals:   Short Term Goals (4 Weeks):  COMPLETED  - Pt will demonstrate compliance with initial home exercise program as prescribed by physical therapist to improve independence with management of condition. - MET  - Pt to improve active range of motion in cervical spine to 0% limitations to allow for improved functional mobility. - MET  - Pt to increase strength to at least 4+/5 of muscles tested to allow for improvement in functional activities. - MET  - Pt to report cervical pain of <5/10 at worst to improve tolerance to ADLs and work. - MET     Long Term Goals (8 Weeks): COMPLETED  - Pt to achieve <36% limitation as measured by the FOTO to demonstrate decreased disability. - MET  - Pt to increase strength to at least 5/5 of muscles tested to allow for  improvement in functional activities. - MET  - Pt to report cervical pain of <3/10 at worst to improve tolerance to ADLs and work. - MET     PLAN      Discharge to [facility].     Jessica Soto, PT

## 2023-06-16 NOTE — PROGRESS NOTES
[facility]  PT Discharge Note    Name: Renea Cox  Clinic Number: [id number]    Therapy Diagnosis:   Encounter Diagnoses   Name Primary?    Cervicalgia Yes    Decreased range of motion of neck     Weakness of both upper extremities     Decreased activity tolerance      Physician: Amy Spain MD    Physician Orders: PT Eval and Treat   Medical Diagnosis from Referral: M54.12 (ICD-10-CM) - Cervical radiculopathy R20.2 (ICD-10-CM) - Paresthesia of left upper extremity R20.2 (ICD-10-CM) - Paresthesia of right upper extremity   Evaluation Date: 4/20/2023    Date of Last visit: 6/16/2023  Total Visits Received: 16    No show: 0  C/c: 1    ASSESSMENT      Renea dillon improved postural awareness while sitting to use tablet and phone in clinic. Her pain is well managed with low pn numbers when present. She demo good ex performance. Discharge to home program.    Discharge reason: Patient has met all of his/her goals.    Discharge FOTO Score: 34%    Goals:   Short Term Goals (4 Weeks):   - Pt will demonstrate compliance with initial home exercise program as prescribed by physical therapist to improve independence with management of condition. - MET  - Pt to improve active range of motion in cervical spine to 0% limitations to allow for improved functional mobility. - MET  - Pt to increase strength to at least 4+/5 of muscles tested to allow for improvement in functional activities. - MET  - Pt to report cervical pain of <5/10 at worst to improve tolerance to ADLs and work. - MET    Long Term Goals (8 Weeks):   - Pt to achieve <36% limitation as measured by the FOTO to demonstrate decreased disability. - MET  - Pt to increase strength to at least 5/5 of muscles tested to allow for improvement in functional activities. - MET  - Pt to report cervical pain of <3/10 at worst to improve tolerance to ADLs and work. - MET    PLAN   This patient is discharged from Physical  Therapy.      Jessica Soto, PT      [facility]   Physical Therapy Treatment Note     Name: Renea Cox  Clinic Number: [id number]    Therapy Diagnosis:   Encounter Diagnoses   Name Primary?    Cervicalgia Yes    Decreased range of motion of neck     Weakness of both upper extremities     Decreased activity tolerance      Physician: Amy Spain MD    Visit Date: 6/16/2023  Physician Orders: PT Eval and Treat   Medical Diagnosis from Referral: M54.12 (ICD-10-CM) - Cervical radiculopathy R20.2 (ICD-10-CM) - Paresthesia of left upper extremity R20.2 (ICD-10-CM) - Paresthesia of right upper extremity   Evaluation Date: 4/20/2023  Authorization Period Expiration: 12/31/2023  Plan of Care Expiration: 6/30/2023  Visit # / Visits authorized: 16/ 20    PTA Visit #: 0/5     Precautions: Standard     Time In: 800   Time Out: 900  Total Billable Time: 54 minutes     SUBJECTIVE     Pt reports: she is a bit sore from driving 6 hours after therapy yesterday and working last night.  Response to previous treatment: no adverse effects   Functional change: improved condition, posture, strength    Pain: 0/10  Location: left neck      OBJECTIVE     Objective Measures updated at progress report unless specified.   See goals.    Treatment     Renea received the treatments listed below:      therapeutic exercises to develop strength, endurance, ROM, flexibility, posture, and core stabilization for 15 minutes including:  Supine horizontal abduction 3x10, blue TB  Supine chin tucks 2x10, 3 sec hold   Seated upper trap stretch x 30 sec x 2  Seated thoracic ext, 2x10  Standing rows (blue TB) 3x10    manual therapy techniques: Soft tissue Mobilization were applied to the: cervical musculature for 08 minutes, including:  Upper t/s extension mobs  Cervical distraction  Suboccipital release    neuromuscular re-education activities to improve: Coordination, Sense, Proprioception, and Posture for 20  minutes. The following activities were included:  Supine chin tuck + lift off x 10, 7 sec hold   Seated chin tucks 2x10, 3 sec hold   Seated scap retraction + no money (green TB) 3x10  Standing extensions (Green TB) 3x10, 3 sec holds  Wall slide with lift x 10, 5 sec hold   Wall clocks x 5 ea, yellow THERABAND  Prone Y, T, Scapular retractions x 10 each    Patient Education and Home Exercises     Home Exercises Provided and Patient Education Provided     Education provided:   - HEP and discharge plan    Written Home Exercises Provided: Patient instructed to cont prior [facility]. Exercises were reviewed and Renea was able to demonstrate them prior to the end of the session.  Renea demonstrated good  understanding of the education provided. See EMR under Patient Instructions for exercises provided during therapy sessions. 6/7/2023.    ASSESSMENT     Renea has high demand for postural control as she travels in car distances at times and works long to late hours. She has received 16 sessions and is discharged to [facility] with improved posture awareness. She reports condition is much better and she will cont home program.    Goals:   Short Term Goals (4 Weeks):  COMPLETED  - Pt will demonstrate compliance with initial home exercise program as prescribed by physical therapist to improve independence with management of condition. - MET  - Pt to improve active range of motion in cervical spine to 0% limitations to allow for improved functional mobility. - MET  - Pt to increase strength to at least 4+/5 of muscles tested to allow for improvement in functional activities. - MET  - Pt to report cervical pain of <5/10 at worst to improve tolerance to ADLs and work. - MET    Long Term Goals (8 Weeks): COMPLETED  - Pt to achieve <36% limitation as measured by the FOTO to demonstrate decreased disability. - MET  - Pt to increase strength to at least 5/5 of muscles tested to allow for improvement in functional activities. - MET  - Pt to  report cervical pain of <3/10 at worst to improve tolerance to ADLs and work. - MET    PLAN     Discharge to [facility].    Jessica Soto, PT

## 2023-09-18 NOTE — PLAN OF CARE
[facility]  Physical Therapy Initial Evaluation    Name: Renea Cox  Clinic Number: [id number]    Therapy Diagnosis:   Encounter Diagnoses   Name Primary?    Cervical radiculopathy     Paresthesia of left upper extremity     Paresthesia of right upper extremity     Cervicalgia     Decreased range of motion of neck     Weakness of both upper extremities     Decreased activity tolerance      Physician: Amy Spain MD    Physician Orders: PT Eval and Treat   Medical Diagnosis from Referral: M54.12 (ICD-10-CM) - Cervical radiculopathy R20.2 (ICD-10-CM) - Paresthesia of left upper extremity R20.2 (ICD-10-CM) - Paresthesia of right upper extremity   Evaluation Date: 4/20/2023  Authorization Period Expiration: 04/05/2024   Plan of Care Expiration: 6/30/2023  Visit # / Visits authorized: 1/ 1    Time In: 11:30  Time Out: 12:20  Total Billable Time: 40 minutes    Precautions: Standard    Subjective   Date of onset: March 2023  History of current condition - Renea reports: Pt reports that she was lifting a lot of furniture on her own at the beginning of March. She did not feel like any one lift started her symptoms, but she began to notice her symptoms afterwards. She has a lot of pain in the lower neck and down into both arms. She gets numbness and tingling in the arms and hands, into the last two fingers of both hands. She works doing , so mostly desk work.     Medical History:   Past Medical History:   Diagnosis Date    Gestational diabetes mellitus (GDM) in third trimester 12/18/2018    Pancreatitis        Surgical History:   Renea Cox  has a past surgical history that includes Cholecystectomy; Tonsillectomy; Adenoidectomy; [place] tooth extraction; Skin graft; and Adenoidectomy (2000).    Medications:   Renea has a current medication list which includes the following prescription(s): cetirizine.    Allergies:   Review of patient's allergies  Patient arrived at infusion center for treatment. Port accessed with positive blood return. Patient tolerated pre-meds and treatment therapies. Port remains accessed, covered with tegaderm, no chg as patient reports a sensitivity to the chg. Patient declined AVS, aware of appt tomorrow. Left infusion center in baseline condition. indicates:   Allergen Reactions    Penicillins Hives        Imaging,   xray: 04/06/2023   FINDINGS: There is mild reversal of the normal cervical lordosis which could relate to neck muscle spasm.  There is mild anterior marginal osteophyte formation at C6.  No acute cervical spine fracture or osseous destructive process.  The odontoid process is intact.  No widening of the anterior atlantodental interval.  No spondylolisthesis.  No prevertebral soft tissue swelling.  The airway is patent.  The lung apices are clear.    Xray: 04/06/2023   FINDINGS: The upper thoracic vertebral bodies are not well evaluated.  There is minor degenerative osteophyte formation in the lower thoracic spine.  There is minimal anterior wedging of the T12 vertebral body, likely congenital/developmental variation.  No acute thoracic compression fracture appreciated there is an 11° dextroscoliotic curvature of the lower thoracic spine as measured from the superior endplate of T4 to the inferior endplate of T12.  There are surgical clips present in the right upper quadrant of the abdomen which likely relate to previous cholecystectomy.  The left and right chest are clear where visualized.    Prior Therapy: No  Social History: Pt lives with her child  Occupation:   Prior Level of Function: Independent in all ADLs  Current Level of Function: Remains independent     Pain:  Current 4/10, worst 10/10, best 0/10   Location: bilateral neck and into bilateral arms  Description: numbness, tingling, throbbing, pressure  Aggravating Factors: sitting with elbows on the desk  Easing Factors: rest    Pts goals: Get back to normal    Red Flag Screening:   Cough  Sneeze  Strain: (--)  Bladder/ bowel: (--)  Falls: (--)  Night pain: (+)  Unexplained weight loss: (--)  General health: No isgns of distress      Objective     Observation: Sits with rounded shoulders and forward head posture.     Cervical Range of Motion:    % Limitation Pain   Flexion 0  Yes     Extension 0 Yes     Right Rotation 25 Yes     Left Rotation 25 Yes     Right Side Bending 0 Yes   Left Side Bending 0 No      Shoulder Range of Motion:   Shoulder Left Right   Flexion 180 180   Abduction 180 180     Upper Extremity Strength  Left UE  Right UE    Shoulder flexion: 4-/5 Shoulder flexion: 4-/5   Shoulder Abduction: 4/5 Shoulder abduction: 4/5   Elbow flexion: 5/5 Elbow flexion: 5/5   Elbow extension: 4+/5 Elbow extension: 4+/5   Wrist extension: 5/5 Wrist extension: 5/5     Special Tests:  Distraction (+)   Compression (-)   Sharp-Hilary (-)   VA test (-)   Lateral Flexion Alar Ligament (-)     Joint Mobility: Normal cervical mobility     Palpation: TTP in B upper trap and cervical araspinals        CMS Impairment/Limitation/Restriction for FOTO NECK Survey    Therapist reviewed FOTO scores for Renea Cox on 4/20/2023.   FOTO documents entered into [facility] [employer] - see Media section.    Limitation Score: 56%  Category: Mobility       TREATMENT   Treatment Time In: 11:40  Treatment Time Out: 12:20  Total Treatment time separate from Evaluation: 40 minutes    Renea received therapeutic exercises to develop strength, ROM, flexibility, and posture for 10 minutes including:  Corner pec stretch, 3x 30s   Shoulder rolls, 1x10 each way  Seated thoracic ext, 1x10  Chin tucks, 1x10 w/ 3s hold  No money w/ green band, 1x10  Standing rows w/ green band, 1x10    Renea received the following manual therapy techniques: for 30 minutes, including:  STM to B upper traps   Cervical distraction  Suboccipital release  PA mobs  Lateral mobs    Discussed the purpose, mechanism, and indications for dry needling with Renea . Patient was cleared of all precautions and contraindications and pt signed written consent and gave verbal consent to dry needling Rx today.   Palpation used to determine dry needling sites. Pt rec'd dry needling in prone position to B upper traps, B C6/7, suboccipitals with 0.25x0.50 mm  needles w/ e-stim.   Pt tolerated treatment well and was not in any distress at the completion of treatment.       Home Exercises and Patient Education Provided    Education provided:   - Role of PT, PT POC, PT diagnosis, PT prognosis, HEP    Written Home Exercises Provided: yes.  Exercises were reviewed and Renea was able to demonstrate them prior to the end of the session.  Renea demonstrated good  understanding of the education provided.     See EMR under Patient Instructions for exercises provided 4/20/2023.    Assessment   Renea is a 27 y.o. female referred to outpatient Physical Therapy with a medical diagnosis of M54.12 (ICD-10-CM) - Cervical radiculopathy R20.2 (ICD-10-CM) - Paresthesia of left upper extremity R20.2 (ICD-10-CM) - Paresthesia of right upper extremity. Physical exam is consistent with cervical dysfunction. Primary impairments include AROM, PROM, joint mobility, strength, soft tissue restrictions, and pain which limits functional mobility. This pt is an excellent candidate for skilled PT tx and stands to benefit from a combination of manual therapy including joint mobilizations with trigger point/myofacscial release, therapeutic exercise to establish core/joint stability, neuromuscular re-education, dry needling, and modalities Prn. The pt has been educated on their dx/POC and consents to further PT tx.    Pt prognosis is Good.   Pt will benefit from skilled outpatient Physical Therapy to address the deficits stated above and in the chart below, provide pt/family education, and to maximize pt's level of independence.     Plan of care discussed with patient: Yes  Pt's spiritual, cultural and educational needs considered and patient is agreeable to the plan of care and goals as stated below:     Anticipated Barriers for therapy: None    Medical Necessity is demonstrated by the following  History  Co-morbidities and personal factors that may impact the plan of care Co-morbidities:   As noted  above    Personal Factors:   no deficits     low   Examination  Body Structures and Functions, activity limitations and participation restrictions that may impact the plan of care Body Regions:   neck  upper extremities    Body Systems:    ROM  strength  motor control    Participation Restrictions:   None    Activity limitations:   Learning and applying knowledge  no deficits    General Tasks and Commands  no deficits    Communication  no deficits    Mobility  lifting and carrying objects    Self care  no deficits    Domestic Life  doing house work (cleaning house, washing dishes, laundry)    Interactions/Relationships  no deficits    Life Areas  no deficits    Community and Social Life  community life  recreation and leisure         moderate   Clinical Presentation stable and uncomplicated low   Decision Making/ Complexity Score: low     Goals:  Short Term Goals (4 Weeks):   - Pt will demonstrate compliance with initial home exercise program as prescribed by physical therapist to improve independence with management of condition.  - Pt to improve active range of motion in cervical spine to 0% limitations to allow for improved functional mobility.  - Pt to increase strength to at least 4+/5 of muscles tested to allow for improvement in functional activities.  - Pt to report cervical pain of <5/10 at worst to improve tolerance to ADLs and work.    Long Term Goals (8 Weeks):   - Pt to achieve <36% limitation as measured by the FOTO to demonstrate decreased disability.  - Pt to increase strength to at least 5/5 of muscles tested to allow for improvement in functional activities.  - Pt to report cervical pain of <3/10 at worst to improve tolerance to ADLs and work.    Plan   Plan of care Certification: 4/20/2023 to 6/30/2023.    Outpatient Physical Therapy 2 times weekly for 8 weeks to include the following interventions: Aquatic Therapy, Cervical/Lumbar Traction, Electrical Stimulation  , Manual Therapy, Moist Heat/  Ice, Neuromuscular Re-ed, Patient Education, Therapeutic Activities, and Therapeutic Exercise.     Monroe Taylor, PT

## 2023-12-13 ENCOUNTER — LAB VISIT (OUTPATIENT)
Dept: LAB | Facility: HOSPITAL | Age: 28
End: 2023-12-13
Payer: COMMERCIAL

## 2023-12-13 ENCOUNTER — OFFICE VISIT (OUTPATIENT)
Dept: FAMILY MEDICINE | Facility: CLINIC | Age: 28
End: 2023-12-13
Payer: COMMERCIAL

## 2023-12-13 VITALS
DIASTOLIC BLOOD PRESSURE: 62 MMHG | WEIGHT: 150 LBS | BODY MASS INDEX: 27.6 KG/M2 | HEART RATE: 116 BPM | OXYGEN SATURATION: 97 % | HEIGHT: 62 IN | SYSTOLIC BLOOD PRESSURE: 140 MMHG

## 2023-12-13 DIAGNOSIS — I10 ESSENTIAL HYPERTENSION: ICD-10-CM

## 2023-12-13 DIAGNOSIS — E03.8 SUBCLINICAL HYPOTHYROIDISM: ICD-10-CM

## 2023-12-13 DIAGNOSIS — R00.0 SINUS TACHYCARDIA: ICD-10-CM

## 2023-12-13 DIAGNOSIS — I10 ESSENTIAL HYPERTENSION: Primary | ICD-10-CM

## 2023-12-13 LAB
ESTIMATED AVG GLUCOSE: 88 MG/DL (ref 68–131)
HBA1C MFR BLD: 4.7 % (ref 4–5.6)
T4 FREE SERPL-MCNC: 0.87 NG/DL (ref 0.71–1.51)
TSH SERPL DL<=0.005 MIU/L-ACNC: 0.97 UIU/ML (ref 0.4–4)

## 2023-12-13 PROCEDURE — 1159F PR MEDICATION LIST DOCUMENTED IN MEDICAL RECORD: ICD-10-PCS | Mod: CPTII,S$GLB,,

## 2023-12-13 PROCEDURE — 84439 ASSAY OF FREE THYROXINE: CPT

## 2023-12-13 PROCEDURE — 3078F PR MOST RECENT DIASTOLIC BLOOD PRESSURE < 80 MM HG: ICD-10-PCS | Mod: CPTII,S$GLB,,

## 2023-12-13 PROCEDURE — 83036 HEMOGLOBIN GLYCOSYLATED A1C: CPT

## 2023-12-13 PROCEDURE — 3077F SYST BP >= 140 MM HG: CPT | Mod: CPTII,S$GLB,,

## 2023-12-13 PROCEDURE — 99999 PR PBB SHADOW E&M-EST. PATIENT-LVL III: ICD-10-PCS | Mod: PBBFAC,,,

## 2023-12-13 PROCEDURE — 3077F PR MOST RECENT SYSTOLIC BLOOD PRESSURE >= 140 MM HG: ICD-10-PCS | Mod: CPTII,S$GLB,,

## 2023-12-13 PROCEDURE — 99214 PR OFFICE/OUTPT VISIT, EST, LEVL IV, 30-39 MIN: ICD-10-PCS | Mod: S$GLB,,,

## 2023-12-13 PROCEDURE — 84443 ASSAY THYROID STIM HORMONE: CPT

## 2023-12-13 PROCEDURE — 36415 COLL VENOUS BLD VENIPUNCTURE: CPT | Mod: PN

## 2023-12-13 PROCEDURE — 93010 ELECTROCARDIOGRAM REPORT: CPT | Mod: S$GLB,,, | Performed by: INTERNAL MEDICINE

## 2023-12-13 PROCEDURE — 99214 OFFICE O/P EST MOD 30 MIN: CPT | Mod: S$GLB,,,

## 2023-12-13 PROCEDURE — 93005 EKG 12-LEAD: ICD-10-PCS | Mod: S$GLB,,,

## 2023-12-13 PROCEDURE — 3078F DIAST BP <80 MM HG: CPT | Mod: CPTII,S$GLB,,

## 2023-12-13 PROCEDURE — 93010 EKG 12-LEAD: ICD-10-PCS | Mod: S$GLB,,, | Performed by: INTERNAL MEDICINE

## 2023-12-13 PROCEDURE — 3008F BODY MASS INDEX DOCD: CPT | Mod: CPTII,S$GLB,,

## 2023-12-13 PROCEDURE — 93005 ELECTROCARDIOGRAM TRACING: CPT | Mod: S$GLB,,,

## 2023-12-13 PROCEDURE — 99999 PR PBB SHADOW E&M-EST. PATIENT-LVL III: CPT | Mod: PBBFAC,,,

## 2023-12-13 PROCEDURE — 3008F PR BODY MASS INDEX (BMI) DOCUMENTED: ICD-10-PCS | Mod: CPTII,S$GLB,,

## 2023-12-13 PROCEDURE — 1159F MED LIST DOCD IN RCRD: CPT | Mod: CPTII,S$GLB,,

## 2023-12-13 RX ORDER — METOPROLOL SUCCINATE 25 MG/1
25 TABLET, EXTENDED RELEASE ORAL DAILY
Qty: 30 TABLET | Refills: 11 | Status: SHIPPED | OUTPATIENT
Start: 2023-12-13 | End: 2024-12-12

## 2023-12-13 NOTE — PROGRESS NOTES
"[facility]  [address]  [place] [address]    Subjective    Chief Complaint:   Chief Complaint   Patient presents with    Hypertension     Blood pressure check, A1 check       History of Present Illness:     Renea Cox is a(n) 27 y.o. female with past medical history as noted below who presents to the clinic today for hypertension concerns.    Reports BP was elevated at previous few visits with her OBGYN. She was also seen by ophthalmology and has a "blood vessel leak." She was hypertensive at this visit as well, was instructed to f/u with PCP.     Today she is hypertensive at 140/62, HR is also 116. States she has had a high heart rate for most of her life. She denies chest pain, palpitations or SOB. No current antihypertensive regimen.     Labs earlier this year showed subclinical hypothyroidism. Will r/c these.            Problem List:   Patient Active Problem List   Diagnosis    Cervical radiculopathy    Paresthesia of left upper extremity    Paresthesia of right upper extremity       Current Outpatient Medications:   Current Outpatient Medications   Medication Instructions    cetirizine (ZYRTEC) 5 mg, Oral, Daily       Surgical History:   Past Surgical History:   Procedure Laterality Date    ADENOIDECTOMY      ADENOIDECTOMY  2000    CHOLECYSTECTOMY      SKIN GRAFT      TONSILLECTOMY      WISDOM TOOTH EXTRACTION         Family History:   Family History   Problem Relation Age of Onset    Diabetes Maternal Grandmother         Type 2    Heart disease Maternal Grandmother     Miscarriages / Stillbirths Maternal Grandmother     Heart disease Maternal Grandfather     Arthritis Maternal Grandfather     Cancer Maternal Grandfather         Skin    Hearing loss Maternal Grandfather     Arthritis Mother     Cancer Mother         Skin    Depression Mother     Drug abuse Mother     Asthma Sister     Asthma Brother     Drug abuse Brother     Cancer Maternal " "Uncle         Esophagus    Depression Father        Allergies:   Review of patient's allergies indicates:   Allergen Reactions    Penicillins Hives       Tobacco Status:   Tobacco Use: Low Risk  (4/6/2023)    Patient History     Smoking Tobacco Use: Never     Smokeless Tobacco Use: Never     Passive Exposure: Not on file       Sexual Activity:   Social History     Substance and Sexual Activity   Sexual Activity Yes    Partners: Male    Birth control/protection: Condom, I.U.D.       Alcohol Use:   Social History     Substance and Sexual Activity   Alcohol Use No         Objective       Vitals:    12/13/23 1130   BP: (!) 140/62   Pulse: (!) 116   SpO2: 97%   Weight: 68.1 kg (150 lb 0.4 oz)   Height: 5' 2" (1.575 m)       Review of Systems   Constitutional:  Negative for chills and fever.   Respiratory:  Negative for cough and shortness of breath.    Cardiovascular:  Negative for chest pain and palpitations.       Physical Exam  Constitutional:       General: She is not in acute distress.     Appearance: Normal appearance.   HENT:      Head: Normocephalic and atraumatic.   Cardiovascular:      Rate and Rhythm: Normal rate and regular rhythm.      Heart sounds: Normal heart sounds. No murmur heard.  Pulmonary:      Effort: Pulmonary effort is normal. No respiratory distress.      Breath sounds: Normal breath sounds. No wheezing.   Skin:     General: Skin is warm.   Neurological:      Mental Status: She is alert and oriented to person, place, and time.   Psychiatric:         Behavior: Behavior normal.           Assessment and Plan:    1. Essential hypertension  -     HEMOGLOBIN A1C; Future; Expected date: 12/13/2023  -     metoprolol succinate (TOPROL-XL) 25 MG 24 hr tablet; Take 1 tablet (25 mg total) by mouth once daily.  Dispense: 30 tablet; Refill: 11    2. Subclinical hypothyroidism  -     TSH; Future; Expected date: 12/13/2023  -     T4, FREE; Future; Expected date: 12/13/2023    3. Sinus tachycardia  -     IN " OFFICE EKG 12-LEAD (to Muse)        Visit summary:    Renea Cox presented today for hypertension concerns.    Blood pressure today is   BP Readings from Last 3 Encounters:   12/13/23 (!) 140/62   04/06/23 132/80   03/23/23 (!) 142/90   I recommend adding new medications as detailed above.. Patient will follow up for a blood pressure recheck with me for an office visit in 2 weeks.. Patient was given ER precautions for symptomatic hypertension or hypotension. Patient will follow up sooner if blood pressure is persistently elevated. They will keep a blood pressure log and bring it to their next appointment.     Tachycardia is not new for this patient, EKG shows sinus tachycardia. No symptoms today such as fever, SOB, or chest pain.      Will r/c thyroid levels     Patient was instructed to report to ER if symptoms become severe.    Follow up: in 2 weeks for BP check      Cece Houser PA-C    This note was created partially with voice dictation software and is prone to errors. This note has been reviewed by me but some errors are inevitable.

## 2023-12-27 ENCOUNTER — OFFICE VISIT (OUTPATIENT)
Dept: FAMILY MEDICINE | Facility: CLINIC | Age: 28
End: 2023-12-27
Payer: COMMERCIAL

## 2023-12-27 VITALS
HEIGHT: 62 IN | WEIGHT: 147.38 LBS | OXYGEN SATURATION: 98 % | SYSTOLIC BLOOD PRESSURE: 122 MMHG | DIASTOLIC BLOOD PRESSURE: 60 MMHG | BODY MASS INDEX: 27.12 KG/M2 | HEART RATE: 87 BPM

## 2023-12-27 DIAGNOSIS — I10 ESSENTIAL HYPERTENSION: Primary | ICD-10-CM

## 2023-12-27 PROCEDURE — 1159F MED LIST DOCD IN RCRD: CPT | Mod: CPTII,S$GLB,,

## 2023-12-27 PROCEDURE — 3008F BODY MASS INDEX DOCD: CPT | Mod: CPTII,S$GLB,,

## 2023-12-27 PROCEDURE — 99213 OFFICE O/P EST LOW 20 MIN: CPT | Mod: S$GLB,,,

## 2023-12-27 PROCEDURE — 3078F DIAST BP <80 MM HG: CPT | Mod: CPTII,S$GLB,,

## 2023-12-27 PROCEDURE — 99213 PR OFFICE/OUTPT VISIT, EST, LEVL III, 20-29 MIN: ICD-10-PCS | Mod: S$GLB,,,

## 2023-12-27 PROCEDURE — 99999 PR PBB SHADOW E&M-EST. PATIENT-LVL III: ICD-10-PCS | Mod: PBBFAC,,,

## 2023-12-27 PROCEDURE — 3078F PR MOST RECENT DIASTOLIC BLOOD PRESSURE < 80 MM HG: ICD-10-PCS | Mod: CPTII,S$GLB,,

## 2023-12-27 PROCEDURE — 3074F SYST BP LT 130 MM HG: CPT | Mod: CPTII,S$GLB,,

## 2023-12-27 PROCEDURE — 1159F PR MEDICATION LIST DOCUMENTED IN MEDICAL RECORD: ICD-10-PCS | Mod: CPTII,S$GLB,,

## 2023-12-27 PROCEDURE — 3074F PR MOST RECENT SYSTOLIC BLOOD PRESSURE < 130 MM HG: ICD-10-PCS | Mod: CPTII,S$GLB,,

## 2023-12-27 PROCEDURE — 3044F PR MOST RECENT HEMOGLOBIN A1C LEVEL <7.0%: ICD-10-PCS | Mod: CPTII,S$GLB,,

## 2023-12-27 PROCEDURE — 3008F PR BODY MASS INDEX (BMI) DOCUMENTED: ICD-10-PCS | Mod: CPTII,S$GLB,,

## 2023-12-27 PROCEDURE — 99999 PR PBB SHADOW E&M-EST. PATIENT-LVL III: CPT | Mod: PBBFAC,,,

## 2023-12-27 PROCEDURE — 3044F HG A1C LEVEL LT 7.0%: CPT | Mod: CPTII,S$GLB,,

## 2023-12-27 NOTE — PROGRESS NOTES
[facility]  [address]  [place] [address]    Subjective    Chief Complaint:   Chief Complaint   Patient presents with    Follow-up     2 wk f/u       History of Present Illness:     Renea Cox is a(n) 27 y.o. female with past medical history as noted below who presents to the clinic today for hypertension f/u.    LOV started metoprolol succinate 25 mg daily for hypertension with tachycardia. EKG performed in office which showed sinus tachycardia, which the pt reported is not new for her.     BP today is 122/60, with similar home readings. HR has stayed between . She does occasionally feel lightheaded but attributes this to recent URI symptoms such as nasal congestion as this started over the past week. Feels like she needs to sit down when this occurs. No palpitations, CP, LOC, or vertigo. She takes her metoprolol at night.      Test Reason : R00.0,    Vent. Rate : 108 BPM     Atrial Rate : 108 BPM     P-R Int : 124 ms          QRS Dur : 076 ms      QT Int : 342 ms       P-R-T Axes : 037 084 019 degrees     QTc Int : 458 ms    Sinus tachycardia  Otherwise normal ECG  When compared with ECG of 23-OCT-2020 09:15,  No significant change was found  Confirmed by Naheed MONTAÑO, Garfield GARDNER ([id number]) on 12/13/2023 2:26:31 PM    Referred By: NADIR JARRETT           Confirmed By:Garfield Farfan MD      Specimen Collected: 12/13/23 12:00 CST Last Resulted: 12/13/23 14:26 CST          Problem List:   Patient Active Problem List   Diagnosis    Cervical radiculopathy    Paresthesia of left upper extremity    Paresthesia of right upper extremity       Current Outpatient Medications:   Current Outpatient Medications   Medication Instructions    cetirizine (ZYRTEC) 5 mg, Oral, Daily    metoprolol succinate (TOPROL-XL) 25 mg, Oral, Daily       Surgical History:   Past Surgical History:   Procedure Laterality Date    ADENOIDECTOMY      ADENOIDECTOMY  2000     "CHOLECYSTECTOMY      SKIN GRAFT      TONSILLECTOMY      WISDOM TOOTH EXTRACTION         Family History:   Family History   Problem Relation Age of Onset    Diabetes Maternal Grandmother         Type 2    Heart disease Maternal Grandmother     Miscarriages / Stillbirths Maternal Grandmother     Heart disease Maternal Grandfather     Arthritis Maternal Grandfather     Cancer Maternal Grandfather         Skin    Hearing loss Maternal Grandfather     Arthritis Mother     Cancer Mother         Skin    Depression Mother     Drug abuse Mother     Asthma Sister     Asthma Brother     Drug abuse Brother     Cancer Maternal Uncle         Esophagus    Depression Father        Allergies:   Review of patient's allergies indicates:   Allergen Reactions    Penicillins Hives       Tobacco Status:   Tobacco Use: Low Risk  (12/13/2023)    Patient History     Smoking Tobacco Use: Never     Smokeless Tobacco Use: Never     Passive Exposure: Not on file       Sexual Activity:   Social History     Substance and Sexual Activity   Sexual Activity Yes    Partners: Male    Birth control/protection: Condom, I.U.D.       Alcohol Use:   Social History     Substance and Sexual Activity   Alcohol Use No         Objective       Vitals:    12/27/23 1121   BP: 122/60   Pulse: 87   SpO2: 98%   Weight: 66.8 kg (147 lb 6 oz)   Height: 5' 2" (1.575 m)       Review of Systems   Constitutional:  Negative for chills and fever.   Respiratory:  Negative for cough and shortness of breath.    Cardiovascular:  Negative for chest pain.       Physical Exam  Constitutional:       General: She is not in acute distress.     Appearance: Normal appearance.   HENT:      Head: Normocephalic and atraumatic.      Right Ear: Tympanic membrane, ear canal and external ear normal.      Left Ear: Tympanic membrane, ear canal and external ear normal.      Nose: No congestion or rhinorrhea.      Mouth/Throat:      Mouth: Mucous membranes are moist.      Pharynx: No oropharyngeal " exudate or posterior oropharyngeal erythema.   Eyes:      General:         Right eye: No discharge.         Left eye: No discharge.      Conjunctiva/sclera: Conjunctivae normal.      Pupils: Pupils are equal, round, and reactive to light.   Cardiovascular:      Rate and Rhythm: Normal rate and regular rhythm.      Heart sounds: Normal heart sounds. No murmur heard.     No friction rub. No gallop.   Pulmonary:      Effort: Pulmonary effort is normal. No respiratory distress.      Breath sounds: Normal breath sounds. No wheezing or rales.   Musculoskeletal:      Cervical back: Normal range of motion and neck supple.   Lymphadenopathy:      Cervical: No cervical adenopathy.   Skin:     General: Skin is warm and dry.      Capillary Refill: Capillary refill takes less than 2 seconds.   Neurological:      Mental Status: She is alert and oriented to person, place, and time.   Psychiatric:         Behavior: Behavior normal.           Assessment and Plan:    1. Essential hypertension        Visit summary:    Renea Cox presented today for hypertension f/u.    Blood pressure today is controlled.  BP Readings from Last 3 Encounters:   12/27/23 122/60   12/13/23 (!) 140/62   04/06/23 132/80   I recommend continuing the current regimen. Patient was given ER precautions for symptomatic hypertension or hypotension. Patient will follow up sooner if blood pressure is persistently elevated. They will keep a blood pressure log and bring it to their next appointment.     Recent EKG with sinus tachycardia. Lightheadedness does not correspond to hypotension with home BP log. Suspect this may be related to current URI over the past week. Patient will let me know if this continues, may need to consider a different antihypertensive such as a calcium channel blocker.   Presentation consistent with viral cause of illness. I do not suspect a bacterial cause of symptoms today. Patient was instructed to follow up if symptoms do not  improve or worsen and to report to ER with severe symptoms.     Cece Houser PA-C    This note was created partially with voice dictation software and is prone to errors. This note has been reviewed by me but some errors are inevitable.

## 2025-04-22 PROBLEM — O10.919 HTN IN PREGNANCY, CHRONIC: Status: ACTIVE | Noted: 2025-04-22

## 2025-05-05 PROBLEM — O35.9XX0 FETAL ABNORMALITY AFFECTING MANAGEMENT OF MOTHER: Status: ACTIVE | Noted: 2025-05-05

## 2025-05-06 PROBLEM — O14.23 HEMOLYSIS, ELEVATED LIVER ENZYMES, AND LOW PLATELET (HELLP) SYNDROME DURING PREGNANCY IN THIRD TRIMESTER: Status: ACTIVE | Noted: 2025-05-06

## 2025-05-13 ENCOUNTER — TELEPHONE (OUTPATIENT)
Dept: FAMILY MEDICINE | Facility: CLINIC | Age: 30
End: 2025-05-13
Payer: COMMERCIAL

## 2025-05-19 ENCOUNTER — LACTATION ENCOUNTER (OUTPATIENT)
Dept: INTENSIVE CARE | Facility: OTHER | Age: 30
End: 2025-05-19

## 2025-05-19 NOTE — LACTATION NOTE
This note was copied from a baby's chart.  Lactation call to mom:  FEEDING PLAN:   Mom reports desire to breast feed and provide exclusive human milk for her baby (she did this for a year for her 5 y/o child as well-praised mom).   Mom reports pumping 4xday, yielding~4oz per pump-praised mom, while also discussing the importance of frequent pumping in first few weeks to establish a full breast milk supply(reviewed goal of ~25oz/24hrs within this next several days). Encouraged pumping 6-8 or more times in 24 hours and skin to skin care as soon as baby able. Discussed pumping every 3 hours all day and every 4 hours 10p-2a-6a over night (especially this next 7-10 days if she desires a full/long term milk supply for [place]).  Recommended pumping schedule discussed. Pumping supplies at bedside; mom  has home lansinoh breast pump. Benefits of breast milk for baby and benefits of providing breast milk for mother discussed. Mom denies any nipple,breast or areola pain or other lactation needs at this time. LC number provided for any needs.  Encouragement and support offered to mom.

## 2025-05-20 ENCOUNTER — LACTATION ENCOUNTER (OUTPATIENT)
Dept: INTENSIVE CARE | Facility: OTHER | Age: 30
End: 2025-05-20

## 2025-06-13 ENCOUNTER — LAB VISIT (OUTPATIENT)
Dept: LAB | Facility: OTHER | Age: 30
End: 2025-06-13
Attending: FAMILY MEDICINE
Payer: COMMERCIAL

## 2025-06-13 DIAGNOSIS — Z84.81 FAMILY HISTORY OF GENETIC DISEASE CARRIER: Primary | ICD-10-CM

## 2025-06-13 DIAGNOSIS — Z84.81 FAMILY HISTORY OF GENETIC DISEASE CARRIER: ICD-10-CM

## 2025-06-13 PROCEDURE — 36415 COLL VENOUS BLD VENIPUNCTURE: CPT

## 2025-06-24 ENCOUNTER — LACTATION ENCOUNTER (OUTPATIENT)
Dept: INTENSIVE CARE | Facility: OTHER | Age: 30
End: 2025-06-24

## 2025-06-24 NOTE — LACTATION NOTE
This note was copied from a baby's chart.  Mother/Baby being followed by lactation.  Lick and Learn session: Assisted mother with positioning baby skin to skin with head near breast in both cross cradle and football hold.  Mother hand expressed drops of breast milk on baby's lips for taste. Infant sleepy. Discussed lick and learn with early feeding cues. Mother pumping 8 x/day 3-5 oz/pump. Discussed dropping one pumping session to 7 x/day. Schedule discussed. Encouraged to monitor volumes over next week with a goal of 30 oz/day.  Cece Landaverde, SANDRAN, RNC, IBCLC

## 2025-08-13 ENCOUNTER — LACTATION ENCOUNTER (OUTPATIENT)
Dept: INTENSIVE CARE | Facility: OTHER | Age: 30
End: 2025-08-13